# Patient Record
Sex: FEMALE | Race: WHITE | NOT HISPANIC OR LATINO | Employment: UNEMPLOYED | URBAN - METROPOLITAN AREA
[De-identification: names, ages, dates, MRNs, and addresses within clinical notes are randomized per-mention and may not be internally consistent; named-entity substitution may affect disease eponyms.]

---

## 2019-02-07 ENCOUNTER — OFFICE VISIT (OUTPATIENT)
Dept: OBGYN | Age: 39
End: 2019-02-07

## 2019-02-07 DIAGNOSIS — Z01.419 WELL WOMAN EXAM WITH ROUTINE GYNECOLOGICAL EXAM: ICD-10-CM

## 2019-02-07 DIAGNOSIS — Z30.011 ENCOUNTER FOR INITIAL PRESCRIPTION OF CONTRACEPTIVE PILLS: Primary | ICD-10-CM

## 2019-02-07 PROCEDURE — 99385 PREV VISIT NEW AGE 18-39: CPT | Performed by: OBSTETRICS & GYNECOLOGY

## 2019-02-07 RX ORDER — LORAZEPAM 1 MG/1
1 TABLET ORAL EVERY 6 HOURS PRN
COMMUNITY
End: 2021-03-30 | Stop reason: SDUPTHER

## 2019-02-07 RX ORDER — ACETAMINOPHEN AND CODEINE PHOSPHATE 120; 12 MG/5ML; MG/5ML
1 SOLUTION ORAL DAILY
Qty: 84 TABLET | Refills: 3 | Status: SHIPPED | OUTPATIENT
Start: 2019-02-07 | End: 2020-01-10 | Stop reason: SDUPTHER

## 2019-02-07 RX ORDER — NORGESTIMATE AND ETHINYL ESTRADIOL 7DAYSX3 28
1 KIT ORAL DAILY
COMMUNITY
End: 2021-03-30 | Stop reason: ALTCHOICE

## 2019-02-07 ASSESSMENT — PAIN SCALES - GENERAL: PAINLEVEL: 0

## 2019-02-12 LAB — HPV16+18+45 E6+E7MRNA CVX NAA+PROBE: NORMAL

## 2019-04-25 ENCOUNTER — TELEPHONE (OUTPATIENT)
Dept: OBGYN | Age: 39
End: 2019-04-25

## 2020-01-10 RX ORDER — ACETAMINOPHEN AND CODEINE PHOSPHATE 120; 12 MG/5ML; MG/5ML
1 SOLUTION ORAL DAILY
Qty: 84 TABLET | Refills: 0 | Status: SHIPPED | OUTPATIENT
Start: 2020-01-10 | End: 2020-04-06

## 2020-04-06 RX ORDER — ACETAMINOPHEN AND CODEINE PHOSPHATE 120; 12 MG/5ML; MG/5ML
1 SOLUTION ORAL DAILY
Qty: 84 TABLET | Refills: 0 | Status: SHIPPED | OUTPATIENT
Start: 2020-04-06 | End: 2020-06-29

## 2020-06-29 RX ORDER — ACETAMINOPHEN AND CODEINE PHOSPHATE 120; 12 MG/5ML; MG/5ML
1 SOLUTION ORAL DAILY
Qty: 28 TABLET | Refills: 0 | Status: SHIPPED | OUTPATIENT
Start: 2020-06-29 | End: 2020-07-27 | Stop reason: SDUPTHER

## 2020-07-22 RX ORDER — ACETAMINOPHEN AND CODEINE PHOSPHATE 120; 12 MG/5ML; MG/5ML
1 SOLUTION ORAL DAILY
Qty: 28 TABLET | Refills: 0 | OUTPATIENT
Start: 2020-07-22

## 2020-07-27 ENCOUNTER — TELEPHONE (OUTPATIENT)
Dept: OBGYN | Age: 40
End: 2020-07-27

## 2020-07-27 RX ORDER — ACETAMINOPHEN AND CODEINE PHOSPHATE 120; 12 MG/5ML; MG/5ML
1 SOLUTION ORAL DAILY
Qty: 28 TABLET | Refills: 0 | Status: SHIPPED | OUTPATIENT
Start: 2020-07-27 | End: 2021-03-30 | Stop reason: ALTCHOICE

## 2020-08-24 ENCOUNTER — OFFICE VISIT (OUTPATIENT)
Dept: OBGYN | Age: 40
End: 2020-08-24

## 2020-08-24 DIAGNOSIS — Z12.39 SCREENING FOR BREAST CANCER: Primary | ICD-10-CM

## 2020-08-24 DIAGNOSIS — Z01.419 GYNECOLOGIC EXAM NORMAL: ICD-10-CM

## 2020-08-24 PROCEDURE — 99396 PREV VISIT EST AGE 40-64: CPT | Performed by: OBSTETRICS & GYNECOLOGY

## 2020-08-24 RX ORDER — ACETAMINOPHEN AND CODEINE PHOSPHATE 120; 12 MG/5ML; MG/5ML
1 SOLUTION ORAL DAILY
Qty: 84 TABLET | Refills: 3 | Status: SHIPPED | OUTPATIENT
Start: 2020-08-24 | End: 2021-07-19

## 2020-08-24 RX ORDER — ACETAMINOPHEN AND CODEINE PHOSPHATE 120; 12 MG/5ML; MG/5ML
1 SOLUTION ORAL DAILY
Qty: 28 TABLET | Refills: 0 | OUTPATIENT
Start: 2020-08-24

## 2020-08-24 RX ORDER — ERGOCALCIFEROL 1.25 MG/1
CAPSULE ORAL
COMMUNITY
Start: 2020-06-28 | End: 2021-05-11 | Stop reason: ALTCHOICE

## 2020-08-24 SDOH — SOCIAL STABILITY: SOCIAL INSECURITY
WITHIN THE LAST YEAR, HAVE TO BEEN RAPED OR FORCED TO HAVE ANY KIND OF SEXUAL ACTIVITY BY YOUR PARTNER OR EX-PARTNER?: PATIENT DECLINED

## 2020-08-24 SDOH — ECONOMIC STABILITY: INCOME INSECURITY: HOW HARD IS IT FOR YOU TO PAY FOR THE VERY BASICS LIKE FOOD, HOUSING, MEDICAL CARE, AND HEATING?: PATIENT DECLINED

## 2020-08-24 SDOH — ECONOMIC STABILITY: FOOD INSECURITY: WITHIN THE PAST 12 MONTHS, THE FOOD YOU BOUGHT JUST DIDN'T LAST AND YOU DIDN'T HAVE MONEY TO GET MORE.: PATIENT DECLINED

## 2020-08-24 SDOH — SOCIAL STABILITY: SOCIAL INSECURITY
WITHIN THE LAST YEAR, HAVE YOU BEEN KICKED, HIT, SLAPPED, OR OTHERWISE PHYSICALLY HURT BY YOUR PARTNER OR EX-PARTNER?: PATIENT DECLINED

## 2020-08-24 SDOH — ECONOMIC STABILITY: TRANSPORTATION INSECURITY
IN THE PAST 12 MONTHS, HAS LACK OF TRANSPORTATION KEPT YOU FROM MEETINGS, WORK, OR FROM GETTING THINGS NEEDED FOR DAILY LIVING?: PATIENT DECLINED

## 2020-08-24 SDOH — SOCIAL STABILITY: SOCIAL NETWORK: HOW OFTEN DO YOU ATTEND CHURCH OR RELIGIOUS SERVICES?: PATIENT DECLINED

## 2020-08-24 SDOH — SOCIAL STABILITY: SOCIAL INSECURITY: WITHIN THE LAST YEAR, HAVE YOU BEEN AFRAID OF YOUR PARTNER OR EX-PARTNER?: PATIENT DECLINED

## 2020-08-24 SDOH — SOCIAL STABILITY: SOCIAL NETWORK: HOW OFTEN DO YOU ATTENT MEETINGS OF THE CLUB OR ORGANIZATION YOU BELONG TO?: PATIENT DECLINED

## 2020-08-24 SDOH — SOCIAL STABILITY: SOCIAL INSECURITY
WITHIN THE LAST YEAR, HAVE YOU BEEN HUMILIATED OR EMOTIONALLY ABUSED IN OTHER WAYS BY YOUR PARTNER OR EX-PARTNER?: PATIENT DECLINED

## 2020-08-24 SDOH — ECONOMIC STABILITY: FOOD INSECURITY: WITHIN THE PAST 12 MONTHS, YOU WORRIED THAT YOUR FOOD WOULD RUN OUT BEFORE YOU GOT MONEY TO BUY MORE.: PATIENT DECLINED

## 2020-08-24 SDOH — SOCIAL STABILITY: SOCIAL NETWORK: ARE YOU MARRIED, WIDOWED, DIVORCED, SEPARATED, NEVER MARRIED, OR LIVING WITH A PARTNER?: PATIENT DECLINED

## 2020-08-24 SDOH — ECONOMIC STABILITY: TRANSPORTATION INSECURITY
IN THE PAST 12 MONTHS, HAS THE LACK OF TRANSPORTATION KEPT YOU FROM MEDICAL APPOINTMENTS OR FROM GETTING MEDICATIONS?: PATIENT DECLINED

## 2020-08-24 SDOH — HEALTH STABILITY: MENTAL HEALTH
STRESS IS WHEN SOMEONE FEELS TENSE, NERVOUS, ANXIOUS, OR CAN'T SLEEP AT NIGHT BECAUSE THEIR MIND IS TROUBLED. HOW STRESSED ARE YOU?: PATIENT DECLINED

## 2020-08-24 SDOH — HEALTH STABILITY: PHYSICAL HEALTH: ON AVERAGE, HOW MANY DAYS PER WEEK DO YOU ENGAGE IN MODERATE TO STRENUOUS EXERCISE (LIKE A BRISK WALK)?: 0 DAYS

## 2020-08-24 SDOH — SOCIAL STABILITY: SOCIAL NETWORK: HOW OFTEN DO YOU GET TOGETHER WITH FRIENDS OR RELATIVES?: PATIENT DECLINED

## 2020-08-24 SDOH — HEALTH STABILITY: PHYSICAL HEALTH: ON AVERAGE, HOW MANY MINUTES DO YOU ENGAGE IN EXERCISE AT THIS LEVEL?: PATIENT DECLINED

## 2020-08-24 SDOH — SOCIAL STABILITY: SOCIAL NETWORK
DO YOU BELONG TO ANY CLUBS OR ORGANIZATIONS SUCH AS CHURCH GROUPS UNIONS, FRATERNAL OR ATHLETIC GROUPS, OR SCHOOL GROUPS?: PATIENT DECLINED

## 2020-08-24 SDOH — SOCIAL STABILITY: SOCIAL NETWORK: IN A TYPICAL WEEK, HOW MANY TIMES DO YOU TALK ON THE PHONE WITH FAMILY, FRIENDS, OR NEIGHBORS?: PATIENT DECLINED

## 2020-08-24 ASSESSMENT — PATIENT HEALTH QUESTIONNAIRE - PHQ9
2. FEELING DOWN, DEPRESSED OR HOPELESS: NOT AT ALL
1. LITTLE INTEREST OR PLEASURE IN DOING THINGS: NOT AT ALL
SUM OF ALL RESPONSES TO PHQ9 QUESTIONS 1 AND 2: 0
CLINICAL INTERPRETATION OF PHQ2 SCORE: NO FURTHER SCREENING NEEDED
CLINICAL INTERPRETATION OF PHQ9 SCORE: NO FURTHER SCREENING NEEDED
SUM OF ALL RESPONSES TO PHQ9 QUESTIONS 1 AND 2: 0

## 2020-08-24 ASSESSMENT — PAIN SCALES - GENERAL: PAINLEVEL: 0

## 2020-08-25 DIAGNOSIS — R14.0 BLOATING: Primary | ICD-10-CM

## 2020-08-26 ENCOUNTER — TELEPHONE (OUTPATIENT)
Dept: OBGYN | Age: 40
End: 2020-08-26

## 2020-08-28 LAB — HPV16+18+45 E6+E7MRNA CVX NAA+PROBE: NORMAL

## 2021-03-30 ENCOUNTER — OFFICE VISIT (OUTPATIENT)
Dept: INTERNAL MEDICINE | Age: 41
End: 2021-03-30

## 2021-03-30 DIAGNOSIS — F41.1 GENERALIZED ANXIETY DISORDER: Primary | ICD-10-CM

## 2021-03-30 DIAGNOSIS — F17.219 CIGARETTE NICOTINE DEPENDENCE WITH NICOTINE-INDUCED DISORDER: ICD-10-CM

## 2021-03-30 DIAGNOSIS — E66.01 SEVERE OBESITY (BMI >= 40) (CMD): ICD-10-CM

## 2021-03-30 PROBLEM — F17.229 CHEWING TOBACCO NICOTINE DEPENDENCE WITH NICOTINE-INDUCED DISORDER: Status: ACTIVE | Noted: 2021-03-30

## 2021-03-30 PROBLEM — Z87.81 HISTORY OF FRACTURE OF RIGHT ANKLE: Status: ACTIVE | Noted: 2021-03-30

## 2021-03-30 PROCEDURE — 99204 OFFICE O/P NEW MOD 45 MIN: CPT | Performed by: INTERNAL MEDICINE

## 2021-03-30 RX ORDER — LORAZEPAM 1 MG/1
1 TABLET ORAL EVERY 12 HOURS PRN
Qty: 10 TABLET | Refills: 0 | Status: SHIPPED | OUTPATIENT
Start: 2021-03-30 | End: 2021-05-07

## 2021-03-30 ASSESSMENT — ENCOUNTER SYMPTOMS
ACTIVITY CHANGE: 0
COUGH: 0
FATIGUE: 0
SINUS PRESSURE: 0
BACK PAIN: 0
NERVOUS/ANXIOUS: 0
NAUSEA: 0
POLYPHAGIA: 0
WOUND: 0
EYE PAIN: 0
DIARRHEA: 0
HEADACHES: 0
APPETITE CHANGE: 0
CHILLS: 0
POLYDIPSIA: 0
EYE ITCHING: 0
VOMITING: 0
EYE DISCHARGE: 0
EYE REDNESS: 0
CONSTIPATION: 0
CHEST TIGHTNESS: 0
SHORTNESS OF BREATH: 0
FEVER: 0
RHINORRHEA: 0
AGITATION: 0

## 2021-03-30 ASSESSMENT — PATIENT HEALTH QUESTIONNAIRE - PHQ9
SUM OF ALL RESPONSES TO PHQ9 QUESTIONS 1 AND 2: 0
2. FEELING DOWN, DEPRESSED OR HOPELESS: NOT AT ALL
CLINICAL INTERPRETATION OF PHQ9 SCORE: NO FURTHER SCREENING NEEDED
1. LITTLE INTEREST OR PLEASURE IN DOING THINGS: NOT AT ALL
CLINICAL INTERPRETATION OF PHQ2 SCORE: NO FURTHER SCREENING NEEDED
SUM OF ALL RESPONSES TO PHQ9 QUESTIONS 1 AND 2: 0

## 2021-03-30 ASSESSMENT — PAIN SCALES - GENERAL: PAINLEVEL: 0

## 2021-05-06 DIAGNOSIS — F41.1 GENERALIZED ANXIETY DISORDER: ICD-10-CM

## 2021-05-07 RX ORDER — LORAZEPAM 1 MG/1
1 TABLET ORAL EVERY 12 HOURS PRN
Qty: 10 TABLET | Refills: 0 | Status: SHIPPED | OUTPATIENT
Start: 2021-05-07 | End: 2021-06-24

## 2021-05-11 ENCOUNTER — OFFICE VISIT (OUTPATIENT)
Dept: INTERNAL MEDICINE | Age: 41
End: 2021-05-11

## 2021-05-11 ENCOUNTER — LAB SERVICES (OUTPATIENT)
Dept: LAB | Age: 41
End: 2021-05-11

## 2021-05-11 DIAGNOSIS — E55.9 VITAMIN D DEFICIENCY: Primary | ICD-10-CM

## 2021-05-11 DIAGNOSIS — E66.01 SEVERE OBESITY (BMI >= 40) (CMD): ICD-10-CM

## 2021-05-11 DIAGNOSIS — F41.1 GENERALIZED ANXIETY DISORDER: ICD-10-CM

## 2021-05-11 DIAGNOSIS — E55.9 VITAMIN D DEFICIENCY: ICD-10-CM

## 2021-05-11 DIAGNOSIS — F17.219 CIGARETTE NICOTINE DEPENDENCE WITH NICOTINE-INDUCED DISORDER: ICD-10-CM

## 2021-05-11 PROCEDURE — 99214 OFFICE O/P EST MOD 30 MIN: CPT | Performed by: INTERNAL MEDICINE

## 2021-05-11 PROCEDURE — 80061 LIPID PANEL: CPT | Performed by: INTERNAL MEDICINE

## 2021-05-11 PROCEDURE — 36415 COLL VENOUS BLD VENIPUNCTURE: CPT | Performed by: INTERNAL MEDICINE

## 2021-05-11 PROCEDURE — 80053 COMPREHEN METABOLIC PANEL: CPT | Performed by: INTERNAL MEDICINE

## 2021-05-11 PROCEDURE — 82306 VITAMIN D 25 HYDROXY: CPT | Performed by: INTERNAL MEDICINE

## 2021-05-11 ASSESSMENT — PATIENT HEALTH QUESTIONNAIRE - PHQ9
SUM OF ALL RESPONSES TO PHQ9 QUESTIONS 1 AND 2: 0
CLINICAL INTERPRETATION OF PHQ9 SCORE: NO FURTHER SCREENING NEEDED
1. LITTLE INTEREST OR PLEASURE IN DOING THINGS: NOT AT ALL
CLINICAL INTERPRETATION OF PHQ2 SCORE: NO FURTHER SCREENING NEEDED
SUM OF ALL RESPONSES TO PHQ9 QUESTIONS 1 AND 2: 0
2. FEELING DOWN, DEPRESSED OR HOPELESS: NOT AT ALL

## 2021-05-11 ASSESSMENT — ENCOUNTER SYMPTOMS
BACK PAIN: 0
EYE DISCHARGE: 0
SINUS PRESSURE: 0
WOUND: 0
CHILLS: 0
EYE REDNESS: 0
VOMITING: 0
CONSTIPATION: 0
RHINORRHEA: 0
NAUSEA: 0
POLYDIPSIA: 0
NERVOUS/ANXIOUS: 0
EYE PAIN: 0
DIARRHEA: 0
ACTIVITY CHANGE: 0
SHORTNESS OF BREATH: 0
FATIGUE: 0
FEVER: 0
EYE ITCHING: 0
AGITATION: 0
POLYPHAGIA: 0
COUGH: 0
CHEST TIGHTNESS: 0
HEADACHES: 0
APPETITE CHANGE: 0

## 2021-05-11 ASSESSMENT — PAIN SCALES - GENERAL: PAINLEVEL: 0

## 2021-05-12 LAB
25(OH)D3+25(OH)D2 SERPL-MCNC: 30.7 NG/ML (ref 30–100)
ALBUMIN SERPL-MCNC: 3.7 G/DL (ref 3.6–5.1)
ALBUMIN/GLOB SERPL: 1 {RATIO} (ref 1–2.4)
ALP SERPL-CCNC: 103 UNITS/L (ref 45–117)
ALT SERPL-CCNC: 30 UNITS/L
ANION GAP SERPL CALC-SCNC: 13 MMOL/L (ref 10–20)
AST SERPL-CCNC: 26 UNITS/L
BILIRUB SERPL-MCNC: 0.3 MG/DL (ref 0.2–1)
BUN SERPL-MCNC: 13 MG/DL (ref 6–20)
BUN/CREAT SERPL: 16 (ref 7–25)
CALCIUM SERPL-MCNC: 9 MG/DL (ref 8.4–10.2)
CHLORIDE SERPL-SCNC: 105 MMOL/L (ref 98–107)
CHOLEST SERPL-MCNC: 176 MG/DL
CHOLEST/HDLC SERPL: 3.5 {RATIO}
CO2 SERPL-SCNC: 23 MMOL/L (ref 21–32)
CREAT SERPL-MCNC: 0.81 MG/DL (ref 0.51–0.95)
FASTING DURATION TIME PATIENT: ABNORMAL H
FASTING DURATION TIME PATIENT: NORMAL H
GFR SERPLBLD BASED ON 1.73 SQ M-ARVRAT: >90 ML/MIN/1.73M2
GLOBULIN SER-MCNC: 3.6 G/DL (ref 2–4)
GLUCOSE SERPL-MCNC: 82 MG/DL (ref 65–99)
HDLC SERPL-MCNC: 51 MG/DL
LDLC SERPL CALC-MCNC: 81 MG/DL
NONHDLC SERPL-MCNC: 125 MG/DL
POTASSIUM SERPL-SCNC: 4 MMOL/L (ref 3.4–5.1)
PROT SERPL-MCNC: 7.3 G/DL (ref 6.4–8.2)
SODIUM SERPL-SCNC: 137 MMOL/L (ref 135–145)
TRIGL SERPL-MCNC: 220 MG/DL

## 2021-05-26 VITALS
RESPIRATION RATE: 18 BRPM | BODY MASS INDEX: 46.5 KG/M2 | TEMPERATURE: 98.3 F | BODY MASS INDEX: 45.47 KG/M2 | SYSTOLIC BLOOD PRESSURE: 122 MMHG | WEIGHT: 261.69 LBS | WEIGHT: 272.38 LBS | WEIGHT: 266.32 LBS | HEART RATE: 78 BPM | DIASTOLIC BLOOD PRESSURE: 82 MMHG | HEART RATE: 86 BPM | WEIGHT: 256.39 LBS | HEIGHT: 64 IN | OXYGEN SATURATION: 99 % | HEIGHT: 64 IN | HEIGHT: 64 IN | TEMPERATURE: 98.2 F | RESPIRATION RATE: 18 BRPM | HEART RATE: 100 BPM | HEIGHT: 64 IN | DIASTOLIC BLOOD PRESSURE: 85 MMHG | TEMPERATURE: 99.2 F | OXYGEN SATURATION: 100 % | BODY MASS INDEX: 43.77 KG/M2 | TEMPERATURE: 98.3 F | RESPIRATION RATE: 18 BRPM | OXYGEN SATURATION: 98 % | SYSTOLIC BLOOD PRESSURE: 128 MMHG | HEART RATE: 92 BPM | SYSTOLIC BLOOD PRESSURE: 131 MMHG | DIASTOLIC BLOOD PRESSURE: 88 MMHG | DIASTOLIC BLOOD PRESSURE: 83 MMHG | OXYGEN SATURATION: 98 % | RESPIRATION RATE: 17 BRPM | BODY MASS INDEX: 44.68 KG/M2 | SYSTOLIC BLOOD PRESSURE: 130 MMHG

## 2021-06-24 DIAGNOSIS — F41.1 GENERALIZED ANXIETY DISORDER: ICD-10-CM

## 2021-06-24 RX ORDER — LORAZEPAM 1 MG/1
1 TABLET ORAL EVERY 12 HOURS PRN
Qty: 10 TABLET | Refills: 0 | Status: SHIPPED | OUTPATIENT
Start: 2021-06-24 | End: 2021-09-03

## 2021-07-19 RX ORDER — ACETAMINOPHEN AND CODEINE PHOSPHATE 120; 12 MG/5ML; MG/5ML
1 SOLUTION ORAL DAILY
Qty: 84 TABLET | Refills: 0 | Status: SHIPPED | OUTPATIENT
Start: 2021-07-19 | End: 2022-04-28 | Stop reason: SDUPTHER

## 2021-08-11 ENCOUNTER — APPOINTMENT (OUTPATIENT)
Dept: INTERNAL MEDICINE | Age: 41
End: 2021-08-11

## 2021-09-03 DIAGNOSIS — F41.1 GENERALIZED ANXIETY DISORDER: ICD-10-CM

## 2021-09-03 RX ORDER — LORAZEPAM 1 MG/1
1 TABLET ORAL EVERY 12 HOURS PRN
Qty: 10 TABLET | Refills: 0 | Status: SHIPPED | OUTPATIENT
Start: 2021-09-03 | End: 2021-10-12

## 2021-10-11 DIAGNOSIS — F41.1 GENERALIZED ANXIETY DISORDER: ICD-10-CM

## 2021-10-12 RX ORDER — ACETAMINOPHEN AND CODEINE PHOSPHATE 120; 12 MG/5ML; MG/5ML
1 SOLUTION ORAL DAILY
Qty: 84 TABLET | Refills: 0 | OUTPATIENT
Start: 2021-10-12

## 2021-10-12 RX ORDER — LORAZEPAM 1 MG/1
1 TABLET ORAL EVERY 12 HOURS PRN
Qty: 10 TABLET | Refills: 0 | Status: SHIPPED | OUTPATIENT
Start: 2021-10-12 | End: 2021-11-14

## 2021-10-14 ENCOUNTER — OFFICE VISIT (OUTPATIENT)
Dept: OBGYN | Age: 41
End: 2021-10-14

## 2021-10-14 VITALS
HEIGHT: 64 IN | OXYGEN SATURATION: 100 % | DIASTOLIC BLOOD PRESSURE: 82 MMHG | HEART RATE: 88 BPM | SYSTOLIC BLOOD PRESSURE: 116 MMHG | RESPIRATION RATE: 16 BRPM | WEIGHT: 254.63 LBS | BODY MASS INDEX: 43.47 KG/M2 | TEMPERATURE: 97.7 F

## 2021-10-14 DIAGNOSIS — Z12.31 ENCOUNTER FOR SCREENING MAMMOGRAM FOR MALIGNANT NEOPLASM OF BREAST: ICD-10-CM

## 2021-10-14 DIAGNOSIS — Z32.02 NEGATIVE PREGNANCY TEST: Primary | ICD-10-CM

## 2021-10-14 DIAGNOSIS — N92.6 IRREGULAR BLEEDING: ICD-10-CM

## 2021-10-14 LAB
B-HCG UR QL: NEGATIVE
INTERNAL PROCEDURAL CONTROLS ACCEPTABLE: NO

## 2021-10-14 PROCEDURE — 81025 URINE PREGNANCY TEST: CPT | Performed by: OBSTETRICS & GYNECOLOGY

## 2021-10-14 PROCEDURE — G0439 PPPS, SUBSEQ VISIT: HCPCS | Performed by: OBSTETRICS & GYNECOLOGY

## 2021-10-14 RX ORDER — ACETAMINOPHEN AND CODEINE PHOSPHATE 120; 12 MG/5ML; MG/5ML
1 SOLUTION ORAL DAILY
Qty: 84 TABLET | Refills: 3 | Status: SHIPPED | OUTPATIENT
Start: 2021-10-14 | End: 2022-06-03

## 2021-10-14 ASSESSMENT — PATIENT HEALTH QUESTIONNAIRE - PHQ9
2. FEELING DOWN, DEPRESSED OR HOPELESS: NOT AT ALL
1. LITTLE INTEREST OR PLEASURE IN DOING THINGS: NOT AT ALL
SUM OF ALL RESPONSES TO PHQ9 QUESTIONS 1 AND 2: 0
CLINICAL INTERPRETATION OF PHQ2 SCORE: NO FURTHER SCREENING NEEDED
SUM OF ALL RESPONSES TO PHQ9 QUESTIONS 1 AND 2: 0
CLINICAL INTERPRETATION OF PHQ9 SCORE: NO FURTHER SCREENING NEEDED

## 2021-10-14 ASSESSMENT — PAIN SCALES - GENERAL: PAINLEVEL: 0

## 2021-11-14 DIAGNOSIS — F41.1 GENERALIZED ANXIETY DISORDER: ICD-10-CM

## 2021-11-14 RX ORDER — LORAZEPAM 1 MG/1
1 TABLET ORAL EVERY 12 HOURS PRN
Qty: 10 TABLET | Refills: 0 | Status: SHIPPED | OUTPATIENT
Start: 2021-11-14 | End: 2022-01-14

## 2022-01-14 DIAGNOSIS — F41.1 GENERALIZED ANXIETY DISORDER: ICD-10-CM

## 2022-01-14 RX ORDER — LORAZEPAM 1 MG/1
1 TABLET ORAL EVERY 12 HOURS PRN
Qty: 10 TABLET | Refills: 0 | Status: SHIPPED | OUTPATIENT
Start: 2022-01-14 | End: 2022-04-28 | Stop reason: SDUPTHER

## 2022-02-06 ENCOUNTER — E-ADVICE (OUTPATIENT)
Dept: INTERNAL MEDICINE | Age: 42
End: 2022-02-06

## 2022-02-06 DIAGNOSIS — R90.89 ABNORMAL CT OF BRAIN: Primary | ICD-10-CM

## 2022-02-07 PROBLEM — R90.89 ABNORMAL CT OF BRAIN: Status: ACTIVE | Noted: 2022-02-07

## 2022-02-16 ENCOUNTER — TELEPHONE (OUTPATIENT)
Dept: NEUROLOGY | Age: 42
End: 2022-02-16

## 2022-03-02 ENCOUNTER — OFFICE VISIT (OUTPATIENT)
Dept: NEUROLOGY | Age: 42
End: 2022-03-02
Attending: INTERNAL MEDICINE

## 2022-03-02 VITALS
WEIGHT: 250 LBS | HEART RATE: 98 BPM | HEIGHT: 64 IN | BODY MASS INDEX: 42.68 KG/M2 | DIASTOLIC BLOOD PRESSURE: 87 MMHG | SYSTOLIC BLOOD PRESSURE: 133 MMHG

## 2022-03-02 DIAGNOSIS — R93.0 ABNORMAL CT OF THE HEAD: Primary | ICD-10-CM

## 2022-03-02 DIAGNOSIS — F17.219 CIGARETTE NICOTINE DEPENDENCE WITH NICOTINE-INDUCED DISORDER: ICD-10-CM

## 2022-03-02 PROCEDURE — 99205 OFFICE O/P NEW HI 60 MIN: CPT | Performed by: PSYCHIATRY & NEUROLOGY

## 2022-03-14 ENCOUNTER — HOSPITAL ENCOUNTER (OUTPATIENT)
Dept: MRI IMAGING | Age: 42
Discharge: HOME | End: 2022-03-14
Attending: PSYCHIATRY & NEUROLOGY

## 2022-03-14 DIAGNOSIS — R93.0 ABNORMAL CT OF THE HEAD: ICD-10-CM

## 2022-03-14 PROCEDURE — 10002805 HB CONTRAST AGENT: Performed by: PSYCHIATRY & NEUROLOGY

## 2022-03-14 PROCEDURE — A9585 GADOBUTROL INJECTION: HCPCS | Performed by: PSYCHIATRY & NEUROLOGY

## 2022-03-14 PROCEDURE — 70553 MRI BRAIN STEM W/O & W/DYE: CPT

## 2022-03-14 RX ORDER — GADOBUTROL 604.72 MG/ML
9 INJECTION INTRAVENOUS ONCE
Status: COMPLETED | OUTPATIENT
Start: 2022-03-14 | End: 2022-03-14

## 2022-03-14 RX ADMIN — GADOBUTROL 9 ML: 604.72 INJECTION INTRAVENOUS at 14:25

## 2022-03-16 ENCOUNTER — OFFICE VISIT (OUTPATIENT)
Dept: NEUROLOGY | Age: 42
End: 2022-03-16

## 2022-03-16 VITALS
HEIGHT: 64 IN | BODY MASS INDEX: 42.68 KG/M2 | SYSTOLIC BLOOD PRESSURE: 135 MMHG | HEART RATE: 85 BPM | DIASTOLIC BLOOD PRESSURE: 85 MMHG | WEIGHT: 250 LBS

## 2022-03-16 DIAGNOSIS — R93.0 ABNORMAL CT OF THE HEAD: Primary | ICD-10-CM

## 2022-03-16 PROCEDURE — 99215 OFFICE O/P EST HI 40 MIN: CPT | Performed by: PSYCHIATRY & NEUROLOGY

## 2022-04-28 ENCOUNTER — OFFICE VISIT (OUTPATIENT)
Dept: INTERNAL MEDICINE | Age: 42
End: 2022-04-28

## 2022-04-28 VITALS
HEART RATE: 93 BPM | DIASTOLIC BLOOD PRESSURE: 81 MMHG | SYSTOLIC BLOOD PRESSURE: 125 MMHG | WEIGHT: 260.25 LBS | HEIGHT: 64 IN | RESPIRATION RATE: 18 BRPM | OXYGEN SATURATION: 99 % | BODY MASS INDEX: 44.43 KG/M2 | TEMPERATURE: 98.5 F

## 2022-04-28 DIAGNOSIS — F41.1 GENERALIZED ANXIETY DISORDER: ICD-10-CM

## 2022-04-28 PROCEDURE — 99213 OFFICE O/P EST LOW 20 MIN: CPT | Performed by: INTERNAL MEDICINE

## 2022-04-28 RX ORDER — LORAZEPAM 1 MG/1
1 TABLET ORAL EVERY 12 HOURS PRN
Qty: 25 TABLET | Refills: 0 | Status: SHIPPED | OUTPATIENT
Start: 2022-04-28

## 2022-04-28 ASSESSMENT — ENCOUNTER SYMPTOMS
WOUND: 0
EYE REDNESS: 0
EYE PAIN: 0
CHEST TIGHTNESS: 0
BACK PAIN: 0
EYE DISCHARGE: 0
CHILLS: 0
POLYDIPSIA: 0
DIARRHEA: 0
FEVER: 0
ACTIVITY CHANGE: 0
COUGH: 0
SINUS PRESSURE: 0
APPETITE CHANGE: 0
NERVOUS/ANXIOUS: 1
EYE ITCHING: 0
HEADACHES: 0
NAUSEA: 0
CONSTIPATION: 0
VOMITING: 0
RHINORRHEA: 0
FATIGUE: 0
SHORTNESS OF BREATH: 0
POLYPHAGIA: 0

## 2022-04-28 ASSESSMENT — PATIENT HEALTH QUESTIONNAIRE - PHQ9
1. LITTLE INTEREST OR PLEASURE IN DOING THINGS: NOT AT ALL
2. FEELING DOWN, DEPRESSED OR HOPELESS: NOT AT ALL
SUM OF ALL RESPONSES TO PHQ9 QUESTIONS 1 AND 2: 0
CLINICAL INTERPRETATION OF PHQ2 SCORE: NO FURTHER SCREENING NEEDED
SUM OF ALL RESPONSES TO PHQ9 QUESTIONS 1 AND 2: 0

## 2022-04-28 ASSESSMENT — PAIN SCALES - GENERAL: PAINLEVEL: 0

## 2022-04-30 DIAGNOSIS — F41.1 GENERALIZED ANXIETY DISORDER: ICD-10-CM

## 2022-05-01 RX ORDER — LORAZEPAM 1 MG/1
1 TABLET ORAL EVERY 12 HOURS PRN
Qty: 10 TABLET | OUTPATIENT
Start: 2022-05-01

## 2022-05-13 ENCOUNTER — E-ADVICE (OUTPATIENT)
Dept: INTERNAL MEDICINE | Age: 42
End: 2022-05-13

## 2022-05-13 DIAGNOSIS — U07.1 COVID-19 VIRUS INFECTION: Primary | ICD-10-CM

## 2022-06-03 RX ORDER — ACETAMINOPHEN AND CODEINE PHOSPHATE 120; 12 MG/5ML; MG/5ML
1 SOLUTION ORAL DAILY
Qty: 84 TABLET | Refills: 0 | Status: SHIPPED | OUTPATIENT
Start: 2022-06-03 | End: 2022-08-25

## 2022-06-15 ENCOUNTER — E-ADVICE (OUTPATIENT)
Dept: INTERNAL MEDICINE | Age: 42
End: 2022-06-15

## 2022-06-15 DIAGNOSIS — Z86.16 HISTORY OF 2019 NOVEL CORONAVIRUS DISEASE (COVID-19): ICD-10-CM

## 2022-06-15 DIAGNOSIS — R05.9 COUGH: Primary | ICD-10-CM

## 2022-06-17 ENCOUNTER — OFFICE VISIT (OUTPATIENT)
Dept: INTERNAL MEDICINE | Age: 42
End: 2022-06-17

## 2022-06-17 ENCOUNTER — IMAGING SERVICES (OUTPATIENT)
Dept: GENERAL RADIOLOGY | Age: 42
End: 2022-06-17
Attending: INTERNAL MEDICINE

## 2022-06-17 VITALS
RESPIRATION RATE: 18 BRPM | OXYGEN SATURATION: 97 % | HEIGHT: 64 IN | TEMPERATURE: 98.4 F | HEART RATE: 85 BPM | DIASTOLIC BLOOD PRESSURE: 75 MMHG | SYSTOLIC BLOOD PRESSURE: 122 MMHG | BODY MASS INDEX: 45.03 KG/M2 | WEIGHT: 263.78 LBS

## 2022-06-17 DIAGNOSIS — E66.01 SEVERE OBESITY (BMI >= 40) (CMD): ICD-10-CM

## 2022-06-17 DIAGNOSIS — Z86.16 HISTORY OF 2019 NOVEL CORONAVIRUS DISEASE (COVID-19): ICD-10-CM

## 2022-06-17 DIAGNOSIS — Z72.0 SMOKING TRYING TO QUIT: ICD-10-CM

## 2022-06-17 DIAGNOSIS — R05.3 CHRONIC COUGH: Primary | ICD-10-CM

## 2022-06-17 DIAGNOSIS — R05.3 CHRONIC COUGH: ICD-10-CM

## 2022-06-17 PROCEDURE — 99214 OFFICE O/P EST MOD 30 MIN: CPT | Performed by: INTERNAL MEDICINE

## 2022-06-17 PROCEDURE — 71046 X-RAY EXAM CHEST 2 VIEWS: CPT | Performed by: INTERNAL MEDICINE

## 2022-06-17 RX ORDER — BUPROPION HYDROCHLORIDE 150 MG/1
TABLET ORAL
Qty: 30 TABLET | Refills: 3 | Status: SHIPPED | OUTPATIENT
Start: 2022-06-17

## 2022-06-17 ASSESSMENT — PATIENT HEALTH QUESTIONNAIRE - PHQ9
2. FEELING DOWN, DEPRESSED OR HOPELESS: NOT AT ALL
1. LITTLE INTEREST OR PLEASURE IN DOING THINGS: NOT AT ALL
SUM OF ALL RESPONSES TO PHQ9 QUESTIONS 1 AND 2: 0
CLINICAL INTERPRETATION OF PHQ2 SCORE: NO FURTHER SCREENING NEEDED
SUM OF ALL RESPONSES TO PHQ9 QUESTIONS 1 AND 2: 0

## 2022-06-17 ASSESSMENT — PAIN SCALES - GENERAL: PAINLEVEL: 0

## 2022-07-11 ENCOUNTER — APPOINTMENT (OUTPATIENT)
Dept: NEUROLOGY | Age: 42
End: 2022-07-11

## 2022-08-18 ENCOUNTER — APPOINTMENT (OUTPATIENT)
Dept: INTERNAL MEDICINE | Age: 42
End: 2022-08-18

## 2022-08-25 ENCOUNTER — APPOINTMENT (OUTPATIENT)
Dept: INTERNAL MEDICINE | Age: 42
End: 2022-08-25

## 2022-08-25 RX ORDER — ACETAMINOPHEN AND CODEINE PHOSPHATE 120; 12 MG/5ML; MG/5ML
1 SOLUTION ORAL DAILY
Qty: 84 TABLET | Refills: 0 | Status: SHIPPED | OUTPATIENT
Start: 2022-08-25 | End: 2022-10-28

## 2022-10-12 ENCOUNTER — OFFICE VISIT (OUTPATIENT)
Dept: INTERNAL MEDICINE CLINIC | Facility: CLINIC | Age: 42
End: 2022-10-12
Payer: COMMERCIAL

## 2022-10-12 VITALS
DIASTOLIC BLOOD PRESSURE: 84 MMHG | BODY MASS INDEX: 44.9 KG/M2 | OXYGEN SATURATION: 98 % | HEART RATE: 88 BPM | HEIGHT: 64 IN | TEMPERATURE: 98 F | SYSTOLIC BLOOD PRESSURE: 122 MMHG | WEIGHT: 263 LBS

## 2022-10-12 DIAGNOSIS — Z00.00 ANNUAL PHYSICAL EXAM: Primary | ICD-10-CM

## 2022-10-12 DIAGNOSIS — R93.0 ABNORMAL MRI OF HEAD: ICD-10-CM

## 2022-10-12 DIAGNOSIS — F41.1 GENERALIZED ANXIETY DISORDER: ICD-10-CM

## 2022-10-12 DIAGNOSIS — Z13.1 SCREENING FOR DIABETES MELLITUS: ICD-10-CM

## 2022-10-12 DIAGNOSIS — Z72.0 TOBACCO USE: ICD-10-CM

## 2022-10-12 DIAGNOSIS — Z13.29 SCREENING FOR THYROID DISORDER: ICD-10-CM

## 2022-10-12 DIAGNOSIS — E78.1 HYPERTRIGLYCERIDEMIA: ICD-10-CM

## 2022-10-12 DIAGNOSIS — Z13.0 SCREENING FOR DEFICIENCY ANEMIA: ICD-10-CM

## 2022-10-12 DIAGNOSIS — Z01.419 WELL WOMAN EXAM: ICD-10-CM

## 2022-10-12 DIAGNOSIS — R92.2 DENSE BREAST: ICD-10-CM

## 2022-10-12 DIAGNOSIS — Z12.31 ENCOUNTER FOR SCREENING MAMMOGRAM FOR MALIGNANT NEOPLASM OF BREAST: ICD-10-CM

## 2022-10-12 PROCEDURE — 3008F BODY MASS INDEX DOCD: CPT | Performed by: INTERNAL MEDICINE

## 2022-10-12 PROCEDURE — 3079F DIAST BP 80-89 MM HG: CPT | Performed by: INTERNAL MEDICINE

## 2022-10-12 PROCEDURE — 99386 PREV VISIT NEW AGE 40-64: CPT | Performed by: INTERNAL MEDICINE

## 2022-10-12 PROCEDURE — 3074F SYST BP LT 130 MM HG: CPT | Performed by: INTERNAL MEDICINE

## 2022-10-12 RX ORDER — ACETAMINOPHEN AND CODEINE PHOSPHATE 120; 12 MG/5ML; MG/5ML
0.35 SOLUTION ORAL DAILY
COMMUNITY
Start: 2022-08-23

## 2022-10-12 RX ORDER — LORAZEPAM 1 MG/1
1 TABLET ORAL EVERY 12 HOURS PRN
COMMUNITY
Start: 2020-12-30 | End: 2022-10-12

## 2022-10-12 RX ORDER — LORAZEPAM 1 MG/1
1 TABLET ORAL EVERY 12 HOURS PRN
Qty: 60 TABLET | Refills: 3 | Status: SHIPPED | OUTPATIENT
Start: 2022-10-12

## 2022-10-12 NOTE — PROGRESS NOTES
Release of PHI signed by patient and faxed to Dr. Nette Antunez at [phone] for release of medical records. Confirmation received. Copies to scanning and one week hold.

## 2022-10-28 RX ORDER — ACETAMINOPHEN AND CODEINE PHOSPHATE 120; 12 MG/5ML; MG/5ML
1 SOLUTION ORAL DAILY
Qty: 28 TABLET | Refills: 0 | Status: SHIPPED | OUTPATIENT
Start: 2022-10-28

## 2022-10-31 RX ORDER — ACETAMINOPHEN AND CODEINE PHOSPHATE 120; 12 MG/5ML; MG/5ML
1 SOLUTION ORAL DAILY
Qty: 84 TABLET | OUTPATIENT
Start: 2022-10-31

## 2022-11-21 ENCOUNTER — OFFICE VISIT (OUTPATIENT)
Dept: OBGYN CLINIC | Facility: CLINIC | Age: 42
End: 2022-11-21
Payer: COMMERCIAL

## 2022-11-21 VITALS
DIASTOLIC BLOOD PRESSURE: 85 MMHG | HEIGHT: 64 IN | HEART RATE: 80 BPM | BODY MASS INDEX: 45.21 KG/M2 | SYSTOLIC BLOOD PRESSURE: 134 MMHG | WEIGHT: 264.81 LBS

## 2022-11-21 DIAGNOSIS — Z01.419 WELL WOMAN EXAM WITH ROUTINE GYNECOLOGICAL EXAM: Primary | ICD-10-CM

## 2022-11-21 PROCEDURE — 3075F SYST BP GE 130 - 139MM HG: CPT | Performed by: OBSTETRICS & GYNECOLOGY

## 2022-11-21 PROCEDURE — 3079F DIAST BP 80-89 MM HG: CPT | Performed by: OBSTETRICS & GYNECOLOGY

## 2022-11-21 PROCEDURE — 99386 PREV VISIT NEW AGE 40-64: CPT | Performed by: OBSTETRICS & GYNECOLOGY

## 2022-11-21 PROCEDURE — 3008F BODY MASS INDEX DOCD: CPT | Performed by: OBSTETRICS & GYNECOLOGY

## 2022-11-21 RX ORDER — ACETAMINOPHEN AND CODEINE PHOSPHATE 120; 12 MG/5ML; MG/5ML
0.35 SOLUTION ORAL DAILY
Qty: 84 TABLET | Refills: 4 | Status: SHIPPED | OUTPATIENT
Start: 2022-11-21

## 2022-11-22 ENCOUNTER — HOSPITAL ENCOUNTER (OUTPATIENT)
Dept: MAMMOGRAPHY | Facility: HOSPITAL | Age: 42
Discharge: HOME | End: 2022-11-22
Attending: INTERNAL MEDICINE
Payer: COMMERCIAL

## 2022-11-22 DIAGNOSIS — Z12.31 ENCOUNTER FOR SCREENING MAMMOGRAM FOR MALIGNANT NEOPLASM OF BREAST: ICD-10-CM

## 2022-11-22 DIAGNOSIS — R92.2 DENSE BREAST: ICD-10-CM

## 2022-11-22 PROCEDURE — 77067 SCR MAMMO BI INCL CAD: CPT | Performed by: INTERNAL MEDICINE

## 2022-11-22 PROCEDURE — 77063 BREAST TOMOSYNTHESIS BI: CPT | Performed by: INTERNAL MEDICINE

## 2022-11-23 ENCOUNTER — TELEPHONE (OUTPATIENT)
Dept: INTERNAL MEDICINE CLINIC | Facility: CLINIC | Age: 42
End: 2022-11-23

## 2022-11-23 NOTE — TELEPHONE ENCOUNTER
Please notify patient that I reviewed the mammogram from 11/22/2022    Imaging  Normal mammogram    Recommendations  Repeat breast exam and scan in 1 year  Will follow-up on lab tests was completed

## 2023-01-16 ENCOUNTER — PATIENT MESSAGE (OUTPATIENT)
Dept: INTERNAL MEDICINE CLINIC | Facility: CLINIC | Age: 43
End: 2023-01-16

## 2023-01-16 NOTE — TELEPHONE ENCOUNTER
From: Oanh Foster  To: Kala Carter MD  Sent: 1/16/2023 10:04 AM CST  Subject: Ansley Brand,  Would it be possible to discuss a prescription for Corinne Olivia or Ozempic?

## 2023-01-18 ENCOUNTER — PATIENT MESSAGE (OUTPATIENT)
Dept: INTERNAL MEDICINE CLINIC | Facility: CLINIC | Age: 43
End: 2023-01-18

## 2023-01-18 NOTE — TELEPHONE ENCOUNTER
[employer] message sent.   Advised fasting lab work to be completed, needs appointment

## 2023-01-18 NOTE — TELEPHONE ENCOUNTER
From: Danii Hansen  To: Izabella Russell MD  Sent: 1/16/2023 10:04 AM CST  Subject: Ken Dow,  Would it be possible to discuss a prescription for [facility] or Ozempic?

## 2023-01-27 ENCOUNTER — LAB ENCOUNTER (OUTPATIENT)
Dept: LAB | Facility: HOSPITAL | Age: 43
End: 2023-01-27
Attending: INTERNAL MEDICINE
Payer: COMMERCIAL

## 2023-01-27 DIAGNOSIS — Z00.00 ANNUAL PHYSICAL EXAM: ICD-10-CM

## 2023-01-27 DIAGNOSIS — Z13.0 SCREENING FOR DEFICIENCY ANEMIA: ICD-10-CM

## 2023-01-27 DIAGNOSIS — Z13.29 SCREENING FOR THYROID DISORDER: ICD-10-CM

## 2023-01-27 DIAGNOSIS — E78.1 HYPERTRIGLYCERIDEMIA: ICD-10-CM

## 2023-01-27 LAB
ALBUMIN SERPL-MCNC: 3.7 G/DL (ref 3.4–5)
ALBUMIN/GLOB SERPL: 1.1 {RATIO} (ref 1–2)
ALP LIVER SERPL-CCNC: 84 U/L
ALT SERPL-CCNC: 31 U/L
ANION GAP SERPL CALC-SCNC: 7 MMOL/L (ref 0–18)
AST SERPL-CCNC: 16 U/L (ref 15–37)
BASOPHILS # BLD AUTO: 0.06 X10(3) UL (ref 0–0.2)
BASOPHILS NFR BLD AUTO: 0.6 %
BILIRUB SERPL-MCNC: 0.5 MG/DL (ref 0.1–2)
BUN BLD-MCNC: 10 MG/DL (ref 7–18)
BUN/CREAT SERPL: 16.9 (ref 10–20)
CALCIUM BLD-MCNC: 8.9 MG/DL (ref 8.5–10.1)
CHLORIDE SERPL-SCNC: 107 MMOL/L (ref 98–112)
CHOLEST SERPL-MCNC: 134 MG/DL (ref ?–200)
CO2 SERPL-SCNC: 26 MMOL/L (ref 21–32)
CREAT BLD-MCNC: 0.59 MG/DL
DEPRECATED RDW RBC AUTO: 44.7 FL (ref 35.1–46.3)
EOSINOPHIL # BLD AUTO: 0.35 X10(3) UL (ref 0–0.7)
EOSINOPHIL NFR BLD AUTO: 3.4 %
ERYTHROCYTE [DISTWIDTH] IN BLOOD BY AUTOMATED COUNT: 13.2 % (ref 11–15)
FASTING PATIENT LIPID ANSWER: YES
FASTING STATUS PATIENT QL REPORTED: YES
GFR SERPLBLD BASED ON 1.73 SQ M-ARVRAT: 115 ML/MIN/1.73M2 (ref 60–?)
GLOBULIN PLAS-MCNC: 3.5 G/DL (ref 2.8–4.4)
GLUCOSE BLD-MCNC: 102 MG/DL (ref 70–99)
HCT VFR BLD AUTO: 42.3 %
HDLC SERPL-MCNC: 47 MG/DL (ref 40–59)
HGB BLD-MCNC: 14.1 G/DL
IMM GRANULOCYTES # BLD AUTO: 0.04 X10(3) UL (ref 0–1)
IMM GRANULOCYTES NFR BLD: 0.4 %
LDLC SERPL CALC-MCNC: 71 MG/DL (ref ?–100)
LYMPHOCYTES # BLD AUTO: 2.56 X10(3) UL (ref 1–4)
LYMPHOCYTES NFR BLD AUTO: 24.7 %
MCH RBC QN AUTO: 30.6 PG (ref 26–34)
MCHC RBC AUTO-ENTMCNC: 33.3 G/DL (ref 31–37)
MCV RBC AUTO: 91.8 FL
MONOCYTES # BLD AUTO: 0.57 X10(3) UL (ref 0.1–1)
MONOCYTES NFR BLD AUTO: 5.5 %
NEUTROPHILS # BLD AUTO: 6.79 X10 (3) UL (ref 1.5–7.7)
NEUTROPHILS # BLD AUTO: 6.79 X10(3) UL (ref 1.5–7.7)
NEUTROPHILS NFR BLD AUTO: 65.4 %
NONHDLC SERPL-MCNC: 87 MG/DL (ref ?–130)
OSMOLALITY SERPL CALC.SUM OF ELEC: 289 MOSM/KG (ref 275–295)
PLATELET # BLD AUTO: 270 10(3)UL (ref 150–450)
POTASSIUM SERPL-SCNC: 4.2 MMOL/L (ref 3.5–5.1)
PROT SERPL-MCNC: 7.2 G/DL (ref 6.4–8.2)
RBC # BLD AUTO: 4.61 X10(6)UL
SODIUM SERPL-SCNC: 140 MMOL/L (ref 136–145)
TRIGL SERPL-MCNC: 80 MG/DL (ref 30–149)
TSI SER-ACNC: 3.09 MIU/ML (ref 0.36–3.74)
VLDLC SERPL CALC-MCNC: 12 MG/DL (ref 0–30)
WBC # BLD AUTO: 10.4 X10(3) UL (ref 4–11)

## 2023-01-27 PROCEDURE — 36415 COLL VENOUS BLD VENIPUNCTURE: CPT

## 2023-01-27 PROCEDURE — 85025 COMPLETE CBC W/AUTO DIFF WBC: CPT

## 2023-01-27 PROCEDURE — 84443 ASSAY THYROID STIM HORMONE: CPT

## 2023-01-27 PROCEDURE — 80053 COMPREHEN METABOLIC PANEL: CPT

## 2023-01-27 PROCEDURE — 80061 LIPID PANEL: CPT

## 2023-02-01 NOTE — PATIENT INSTRUCTIONS
You were seen in clinic today for follow-up. Today, we reviewed your blood work and your cholesterol levels have improved from the last check. Today, we focused on your recent lab results. Overall this has improved from 2021  - Plan to keep up with exercise as able  - Continue optimizing nutrition as best as you can  - Let us obtain x-rays of the knees. We recommended  -Would recommend initial trial of conservative therapy:    -Acetaminophen 500-650 mg every 4-6 hours as needed for pain relief  -Ibuprofen 200-400 mg every 8 hours as needed for anti-inflammatory and pain relief  -For more severe pain, will try cyclobenzaprine 5mg. Take first at nighttime as this can cause sleepiness, drowsiness. If tolerated well, can use 3 times a day on an as-needed basis.   Be careful with driving or operating heavy machinery on this medication  -Trial of physical therapy    We will initiate a trial of Mounjaro: 0.5 mL injection once a week for 4 weeks  - We discussed potential side effects of this medication including upset stomach, abdominal pain, pancreatitis  - Try to minimize use of alcohol while on this medication to reduce the risk of pancreatitis  - This is an adjunct of therapy that should be combined with good nutrition and exercise habits      Return to clinic in 8 months for annual physical examination

## 2023-02-02 ENCOUNTER — OFFICE VISIT (OUTPATIENT)
Dept: INTERNAL MEDICINE CLINIC | Facility: CLINIC | Age: 43
End: 2023-02-02

## 2023-02-02 VITALS
TEMPERATURE: 98 F | DIASTOLIC BLOOD PRESSURE: 74 MMHG | BODY MASS INDEX: 45.01 KG/M2 | OXYGEN SATURATION: 100 % | HEART RATE: 78 BPM | SYSTOLIC BLOOD PRESSURE: 122 MMHG | HEIGHT: 64 IN | WEIGHT: 263.63 LBS

## 2023-02-02 DIAGNOSIS — M25.562 CHRONIC PAIN OF BOTH KNEES: ICD-10-CM

## 2023-02-02 DIAGNOSIS — R93.0 ABNORMAL MRI OF HEAD: ICD-10-CM

## 2023-02-02 DIAGNOSIS — F41.1 GENERALIZED ANXIETY DISORDER: ICD-10-CM

## 2023-02-02 DIAGNOSIS — E78.1 HYPERTRIGLYCERIDEMIA: Primary | ICD-10-CM

## 2023-02-02 DIAGNOSIS — M25.561 CHRONIC PAIN OF BOTH KNEES: ICD-10-CM

## 2023-02-02 DIAGNOSIS — G89.29 CHRONIC PAIN OF BOTH KNEES: ICD-10-CM

## 2023-02-02 PROCEDURE — 3078F DIAST BP <80 MM HG: CPT | Performed by: INTERNAL MEDICINE

## 2023-02-02 PROCEDURE — 99214 OFFICE O/P EST MOD 30 MIN: CPT | Performed by: INTERNAL MEDICINE

## 2023-02-02 PROCEDURE — 3074F SYST BP LT 130 MM HG: CPT | Performed by: INTERNAL MEDICINE

## 2023-02-02 PROCEDURE — 3008F BODY MASS INDEX DOCD: CPT | Performed by: INTERNAL MEDICINE

## 2023-02-02 RX ORDER — TIRZEPATIDE 2.5 MG/.5ML
0.5 INJECTION, SOLUTION SUBCUTANEOUS WEEKLY
Qty: 2 ML | Refills: 0 | Status: SHIPPED | OUTPATIENT
Start: 2023-02-02 | End: 2023-03-04

## 2023-02-24 ENCOUNTER — PATIENT MESSAGE (OUTPATIENT)
Dept: INTERNAL MEDICINE CLINIC | Facility: CLINIC | Age: 43
End: 2023-02-24

## 2023-02-24 NOTE — TELEPHONE ENCOUNTER
From: Jo Young  To: Betsy Vera MD  Sent: 2/24/2023 7:53 AM CST  Subject: Malu Crooked! I took my 4th shot this morning. So far no side effects and I've lost 6 pounds! Should we go up a dose? This was the last pen of the 2.5. Thank you.

## 2023-03-22 ENCOUNTER — TELEPHONE (OUTPATIENT)
Dept: INTERNAL MEDICINE CLINIC | Facility: CLINIC | Age: 43
End: 2023-03-22

## 2023-03-27 RX ORDER — TIRZEPATIDE 5 MG/.5ML
INJECTION, SOLUTION SUBCUTANEOUS
Qty: 2 ML | Refills: 0 | OUTPATIENT
Start: 2023-03-27

## 2023-03-27 RX ORDER — TIRZEPATIDE 7.5 MG/.5ML
7.5 INJECTION, SOLUTION SUBCUTANEOUS WEEKLY
Qty: 2 ML | Refills: 0 | Status: SHIPPED | OUTPATIENT
Start: 2023-03-27 | End: 2023-04-26

## 2023-04-04 ENCOUNTER — PATIENT MESSAGE (OUTPATIENT)
Dept: OBGYN CLINIC | Facility: CLINIC | Age: 43
End: 2023-04-04

## 2023-04-04 RX ORDER — ACETAMINOPHEN AND CODEINE PHOSPHATE 120; 12 MG/5ML; MG/5ML
0.35 SOLUTION ORAL DAILY
Qty: 84 TABLET | Refills: 2 | Status: SHIPPED | OUTPATIENT
Start: 2023-04-04

## 2023-04-04 NOTE — TELEPHONE ENCOUNTER
From: Bobo Sutton  To: Tello Villarreal MD  Sent: 4/4/2023 10:09 AM CDT  Subject: Prescription     Hello,  My insurance is now requiring I refill my birth control via the mail delivery option instead of [facility] or [facility]. Can you please send that order to [facility]? [facility] just [facility].

## 2023-05-20 NOTE — PATIENT INSTRUCTIONS
You are seen in clinic today for follow-up of weight management. We have been doing well on the higher doses of Mounjaro, and we should proceed to increase the medication further. Lets continue with Mounjaro 10 mg injection once a week for the next 4 weeks  - Send us a [employer] message in about 3 weeks prior to next refill to determine if we can increase the dose further.  -It is reasonable to hold on the same dosage to see if this is sufficient. We did place refills at the 10 mg weekly injections if needed. Keep up the good work with optimizing nutrition and exercising as tolerated    Your next Pap smear will be due this year with Dr. Doug Donohue, please make an appointment around August    We did discuss the importance of stopping smoking, notify us if you are need of assistance in stopping smoking in the future    Return to clinic in 5 months for annual physical examination. Congratulations on your great progress thus far, keep up the good work!

## 2023-05-22 ENCOUNTER — OFFICE VISIT (OUTPATIENT)
Dept: INTERNAL MEDICINE CLINIC | Facility: CLINIC | Age: 43
End: 2023-05-22

## 2023-05-22 VITALS
BODY MASS INDEX: 40.63 KG/M2 | WEIGHT: 238 LBS | HEART RATE: 92 BPM | OXYGEN SATURATION: 98 % | DIASTOLIC BLOOD PRESSURE: 76 MMHG | HEIGHT: 64 IN | SYSTOLIC BLOOD PRESSURE: 110 MMHG | TEMPERATURE: 99 F

## 2023-05-22 DIAGNOSIS — Z72.0 TOBACCO USE: ICD-10-CM

## 2023-05-22 DIAGNOSIS — F41.1 GENERALIZED ANXIETY DISORDER: ICD-10-CM

## 2023-05-22 DIAGNOSIS — E78.1 HYPERTRIGLYCERIDEMIA: Primary | ICD-10-CM

## 2023-05-22 PROCEDURE — 99213 OFFICE O/P EST LOW 20 MIN: CPT | Performed by: INTERNAL MEDICINE

## 2023-05-22 PROCEDURE — 3008F BODY MASS INDEX DOCD: CPT | Performed by: INTERNAL MEDICINE

## 2023-05-22 PROCEDURE — 3078F DIAST BP <80 MM HG: CPT | Performed by: INTERNAL MEDICINE

## 2023-05-22 PROCEDURE — 3074F SYST BP LT 130 MM HG: CPT | Performed by: INTERNAL MEDICINE

## 2023-05-22 RX ORDER — TIRZEPATIDE 10 MG/.5ML
10 INJECTION, SOLUTION SUBCUTANEOUS WEEKLY
Qty: 2 ML | Refills: 2 | Status: SHIPPED | OUTPATIENT
Start: 2023-05-22

## 2023-05-24 ENCOUNTER — PATIENT MESSAGE (OUTPATIENT)
Dept: INTERNAL MEDICINE CLINIC | Facility: CLINIC | Age: 43
End: 2023-05-24

## 2023-05-25 NOTE — TELEPHONE ENCOUNTER
Patient called for status  She takes once a week, will be taking again tomorrow   Please call to advise  Tasked to Dr Veda Ramirez

## 2023-05-26 RX ORDER — TIRZEPATIDE 5 MG/.5ML
5 INJECTION, SOLUTION SUBCUTANEOUS WEEKLY
Qty: 2 ML | Refills: 0 | Status: SHIPPED | OUTPATIENT
Start: 2023-05-26

## 2023-06-21 ENCOUNTER — PATIENT MESSAGE (OUTPATIENT)
Dept: INTERNAL MEDICINE CLINIC | Facility: CLINIC | Age: 43
End: 2023-06-21

## 2023-06-22 RX ORDER — TIRZEPATIDE 12.5 MG/.5ML
12.5 INJECTION, SOLUTION SUBCUTANEOUS WEEKLY
Qty: 2 ML | Refills: 0 | Status: SHIPPED | OUTPATIENT
Start: 2023-06-22

## 2023-06-22 RX ORDER — TIRZEPATIDE 12.5 MG/.5ML
12.5 INJECTION, SOLUTION SUBCUTANEOUS WEEKLY
Qty: 2 ML | Refills: 0 | Status: SHIPPED | OUTPATIENT
Start: 2023-06-22 | End: 2023-06-22

## 2023-06-22 NOTE — TELEPHONE ENCOUNTER
Patient asked that medication be sent to [facility] on [address]. Since insurance no longer covers at [facility], medication was sent to [facility] and cancelled at [facility]. My chart message sent to patient.

## 2023-07-24 ENCOUNTER — PATIENT MESSAGE (OUTPATIENT)
Dept: INTERNAL MEDICINE CLINIC | Facility: CLINIC | Age: 43
End: 2023-07-24

## 2023-07-24 RX ORDER — TIRZEPATIDE 12.5 MG/.5ML
12.5 INJECTION, SOLUTION SUBCUTANEOUS WEEKLY
Qty: 2 ML | Refills: 1 | Status: SHIPPED | OUTPATIENT
Start: 2023-07-24

## 2023-07-24 NOTE — TELEPHONE ENCOUNTER
From: Rose Hunt  To: Kanchan George MD  Sent: 7/24/2023 4:32 PM CDT  Subject[facility]    I received the attached message from the pharmacy on the refill just sent.

## 2023-07-24 NOTE — TELEPHONE ENCOUNTER
Refill request is for a maintenance medication and has met the criteria specified in the Ambulatory Medication Refill Standing Order for eligibility, visits, laboratory, alerts and was sent to the requested pharmacy. Requested Prescriptions     Signed Prescriptions Disp Refills    Tirzepatide (MOUNJARO) 12.5 MG/0.5ML Subcutaneous Solution Pen-injector 2 mL 1     Sig: Inject 12.5 mg into the skin once a week.      Authorizing Provider: Stepan Medina     Ordering User: Lien Honeycutt

## 2023-07-25 ENCOUNTER — TELEPHONE (OUTPATIENT)
Dept: INTERNAL MEDICINE CLINIC | Facility: CLINIC | Age: 43
End: 2023-07-25

## 2023-07-25 NOTE — TELEPHONE ENCOUNTER
----- Message from Danii Hansen sent at 7/24/2023  4:53 PM CDT -----  Regarding: [facility]  Contact: [phone]  Thanks- just talked to them they do need an authorization

## 2023-07-27 NOTE — TELEPHONE ENCOUNTER
Patient is calling to check the status of the prior authorization below. Diaz Greenper out of the office all week, is there somewhere else this request should be sent or okay to wait for her return? Patient is currently out of the medication. And is supposed to take her next tablet tomorrow morning.

## 2023-07-27 NOTE — TELEPHONE ENCOUNTER
Awaiting fax. Will update pt as soon as possible.  See [facility] 7/24, pt aware this medication can be filled at out-of-pocket cost.

## 2023-07-28 NOTE — TELEPHONE ENCOUNTER
Patient called back for status. Patient talked to [facility]. They said they have not received anything from us.      Please call [facility] [phone]

## 2023-07-28 NOTE — TELEPHONE ENCOUNTER
Prior Authorization request rec'd from [facility] for:  [facility]  Form requires completion   Fax placed in purple folder  Tasked to [employer]

## 2023-07-31 NOTE — TELEPHONE ENCOUNTER
Patient is calling she received notification that the PA has been denied    Patient is asking for a call back with the next steps    Phone [phone]

## 2023-08-03 NOTE — TELEPHONE ENCOUNTER
Patient is calling checking on the status of a returned call. Patient states she has been out of her medication for over 2 weeks.     Please call and advise

## 2023-08-07 ENCOUNTER — PATIENT MESSAGE (OUTPATIENT)
Dept: INTERNAL MEDICINE CLINIC | Facility: CLINIC | Age: 43
End: 2023-08-07

## 2023-08-07 DIAGNOSIS — E78.1 HYPERTRIGLYCERIDEMIA: ICD-10-CM

## 2023-08-07 DIAGNOSIS — E88.81 METABOLIC SYNDROME: Primary | ICD-10-CM

## 2023-08-07 NOTE — TELEPHONE ENCOUNTER
Spoke to pt - we discussed she has started the appeal for [facility];    [facility]JYOTHI SANDOVAL and Uri Rouse are the alternatives for wt loss;   she will check insurance  She has had great success with wt loss so far

## 2023-08-09 NOTE — TELEPHONE ENCOUNTER
Received request for scheduling a peer to peer for Wendy Herrera [facility]

## 2023-08-10 NOTE — TELEPHONE ENCOUNTER
[facility] faxed Prior Authorization Request Form for Mounjaro Inj 12.5/0.5    Placed in purple folder

## 2023-08-11 NOTE — TELEPHONE ENCOUNTER
Received another fax requesting a prior authorization for Mounjaro Injection 12.5/0.5    Patient's ID # [id number]  Placed in [facility]

## 2023-08-14 NOTE — TELEPHONE ENCOUNTER
I am happy to report that Tess Cooper has excellent vision and ocular health for his age! Tess no longer needs eye exams with me, Dr. Vázquez. I am graduating him to our healthy eyes clinic with my partner, Dr. Krupa Ritter.     Dr. Ritter will monitor Tess and optimize his visual development. She can also prescribe glasses and contact lenses if the need should arise.    To [employer]

## 2023-08-18 NOTE — TELEPHONE ENCOUNTER
To Cash Vega,    See PA denial info below:     Denied   8/17/2023 10:51 AM  Case ID: [id number] Appeal supported: No  Note from payer: Request Reference Number: [id number]. WEGOVY INJ 0.5MG is denied for not meeting the prior authorization requirement(s). Details of this decision are in the notice attached below or have been faxed to you.    Payer: [facility]

## 2023-08-21 NOTE — TELEPHONE ENCOUNTER
[facility] faxing request for additional information for PA  [facility]    Placed in purple folder

## 2023-08-22 ENCOUNTER — TELEPHONE (OUTPATIENT)
Dept: INTERNAL MEDICINE CLINIC | Facility: CLINIC | Age: 43
End: 2023-08-22

## 2023-08-22 ENCOUNTER — OFFICE VISIT (OUTPATIENT)
Dept: INTERNAL MEDICINE CLINIC | Facility: CLINIC | Age: 43
End: 2023-08-22

## 2023-08-22 VITALS
HEIGHT: 64 IN | OXYGEN SATURATION: 100 % | BODY MASS INDEX: 38.04 KG/M2 | HEART RATE: 90 BPM | WEIGHT: 222.81 LBS | DIASTOLIC BLOOD PRESSURE: 70 MMHG | TEMPERATURE: 98 F | SYSTOLIC BLOOD PRESSURE: 110 MMHG

## 2023-08-22 DIAGNOSIS — Z13.1 SCREENING FOR DIABETES MELLITUS: ICD-10-CM

## 2023-08-22 DIAGNOSIS — E88.81 METABOLIC SYNDROME: Primary | ICD-10-CM

## 2023-08-22 DIAGNOSIS — Z13.0 SCREENING FOR DEFICIENCY ANEMIA: ICD-10-CM

## 2023-08-22 DIAGNOSIS — Z13.29 SCREENING FOR THYROID DISORDER: ICD-10-CM

## 2023-08-22 DIAGNOSIS — F41.1 GENERALIZED ANXIETY DISORDER: ICD-10-CM

## 2023-08-22 DIAGNOSIS — E78.1 HYPERTRIGLYCERIDEMIA: ICD-10-CM

## 2023-08-22 PROCEDURE — 3078F DIAST BP <80 MM HG: CPT | Performed by: INTERNAL MEDICINE

## 2023-08-22 PROCEDURE — 3008F BODY MASS INDEX DOCD: CPT | Performed by: INTERNAL MEDICINE

## 2023-08-22 PROCEDURE — 3074F SYST BP LT 130 MM HG: CPT | Performed by: INTERNAL MEDICINE

## 2023-08-22 PROCEDURE — 99214 OFFICE O/P EST MOD 30 MIN: CPT | Performed by: INTERNAL MEDICINE

## 2023-08-22 NOTE — PATIENT INSTRUCTIONS
You are seen in clinic today for further management of weight loss. Unfortunately, Daryl barroso is now being covered by your insurance any further. We will try [facility] as an alternative. However, there is very limited supply of this medication in which some patients have been having difficulties with refills    As you have tolerated Mounjaro with good effects, we did go over the requirements for your insurance  - Medically, we qualify  - We did have today's office visit within 30-day timeframe  - We also included medical diagnoses that should qualify you for [facility]  - To improve our chances, we will try the intermediate dose of 1.7 mg every week. We prescribed this for 3 months  - Please notify us if you have difficulty with obtaining this medication    Monitor for any side effects including abdominal pain, nausea, vomiting, diarrhea    We will place fasting blood work to be completed prior to your next visit which will be your annual physical examination. Please fast for 12 hours and we will notify you of the lab results. You are making fantastic progress even without the use of medications at this time. Keep up the great work with optimizing nutrition, staying active.

## 2023-08-22 NOTE — TELEPHONE ENCOUNTER
ePA submitted for [facility]JYOTHI SANDOVAL.     Waiting for Payer Response   8/22/2023  3:50 PM  Case ID: [id number]

## 2023-08-23 NOTE — TELEPHONE ENCOUNTER
PA Approved. Authorization number: [id number]   Authorized from August 22, 2023 to February 22, 2024       Pt updated via [employer].

## 2023-09-25 NOTE — TELEPHONE ENCOUNTER
[facility] faxing requesting additional information for PA  Mounjaro Injection    Placed in red folder

## 2023-10-18 ENCOUNTER — TELEPHONE (OUTPATIENT)
Dept: INTERNAL MEDICINE CLINIC | Facility: CLINIC | Age: 43
End: 2023-10-18

## 2023-10-18 ENCOUNTER — PATIENT MESSAGE (OUTPATIENT)
Dept: INTERNAL MEDICINE CLINIC | Facility: CLINIC | Age: 43
End: 2023-10-18

## 2023-10-18 DIAGNOSIS — E88.810 METABOLIC SYNDROME: Primary | ICD-10-CM

## 2023-10-18 DIAGNOSIS — E78.1 HYPERTRIGLYCERIDEMIA: ICD-10-CM

## 2023-10-20 RX ORDER — LORAZEPAM 1 MG/1
1 TABLET ORAL EVERY 12 HOURS PRN
Qty: 60 TABLET | Refills: 3 | Status: SHIPPED | OUTPATIENT
Start: 2023-10-20

## 2023-10-20 NOTE — TELEPHONE ENCOUNTER
To MD:  The above refill request is for a controlled substance. Please review pended medication order. Print and sign for staff to fax to pharmacy or prescribe electronically.     Last office visit: 8/22/23  Last time refill sent and quantity/refills: 10/12/22 qty 60 plus 3

## 2023-10-23 NOTE — TELEPHONE ENCOUNTER
Cancellation notice received from [facility] re:  [facility]JYOTHI GIMENEZ     The medication was previously approved on [id number]  from 8/22/23 - 2/22/24    Fax placed in red folder

## 2023-11-11 RX ORDER — ACETAMINOPHEN AND CODEINE PHOSPHATE 120; 12 MG/5ML; MG/5ML
0.35 SOLUTION ORAL DAILY
Qty: 84 TABLET | Refills: 3 | OUTPATIENT
Start: 2023-11-11

## 2023-11-13 DIAGNOSIS — E78.1 HYPERTRIGLYCERIDEMIA: ICD-10-CM

## 2023-11-13 DIAGNOSIS — E88.810 METABOLIC SYNDROME: ICD-10-CM

## 2023-11-13 NOTE — TELEPHONE ENCOUNTER
Patient is calling to request a script for [facility] YRN SANDOVAL for a 90 day supply    Patient states starting January 1, 2024 her Ins will no longer cover Wegovy out of pocket  Patient would like to get the medication for a 3 month supply

## 2024-01-26 ENCOUNTER — OFFICE VISIT (OUTPATIENT)
Dept: INTERNAL MEDICINE CLINIC | Facility: CLINIC | Age: 44
End: 2024-01-26
Payer: COMMERCIAL

## 2024-01-26 VITALS
HEIGHT: 64 IN | HEART RATE: 85 BPM | SYSTOLIC BLOOD PRESSURE: 106 MMHG | RESPIRATION RATE: 16 BRPM | OXYGEN SATURATION: 98 % | WEIGHT: 201 LBS | DIASTOLIC BLOOD PRESSURE: 78 MMHG | TEMPERATURE: 98 F | BODY MASS INDEX: 34.31 KG/M2

## 2024-01-26 DIAGNOSIS — Z13.0 SCREENING FOR DEFICIENCY ANEMIA: ICD-10-CM

## 2024-01-26 DIAGNOSIS — Z12.11 SCREENING FOR COLON CANCER: ICD-10-CM

## 2024-01-26 DIAGNOSIS — Z13.220 SCREENING FOR LIPOID DISORDERS: ICD-10-CM

## 2024-01-26 DIAGNOSIS — Z12.31 ENCOUNTER FOR SCREENING MAMMOGRAM FOR MALIGNANT NEOPLASM OF BREAST: ICD-10-CM

## 2024-01-26 DIAGNOSIS — Z00.00 ANNUAL PHYSICAL EXAM: Primary | ICD-10-CM

## 2024-01-26 DIAGNOSIS — R68.84 JAW PAIN: ICD-10-CM

## 2024-01-26 DIAGNOSIS — E88.810 METABOLIC SYNDROME: ICD-10-CM

## 2024-01-26 DIAGNOSIS — R92.30 DENSE BREAST: ICD-10-CM

## 2024-01-26 DIAGNOSIS — Z72.0 TOBACCO USE: ICD-10-CM

## 2024-01-26 DIAGNOSIS — E78.1 HYPERTRIGLYCERIDEMIA: ICD-10-CM

## 2024-01-26 DIAGNOSIS — Z01.419 WELL WOMAN EXAM: ICD-10-CM

## 2024-01-26 DIAGNOSIS — Z13.29 SCREENING FOR THYROID DISORDER: ICD-10-CM

## 2024-01-26 DIAGNOSIS — Z13.1 SCREENING FOR DIABETES MELLITUS: ICD-10-CM

## 2024-01-26 PROCEDURE — 99396 PREV VISIT EST AGE 40-64: CPT | Performed by: INTERNAL MEDICINE

## 2024-01-26 PROCEDURE — 3008F BODY MASS INDEX DOCD: CPT | Performed by: INTERNAL MEDICINE

## 2024-01-26 PROCEDURE — 3078F DIAST BP <80 MM HG: CPT | Performed by: INTERNAL MEDICINE

## 2024-01-26 PROCEDURE — 3074F SYST BP LT 130 MM HG: CPT | Performed by: INTERNAL MEDICINE

## 2024-01-26 RX ORDER — TIRZEPATIDE 5 MG/.5ML
12.5 INJECTION, SOLUTION SUBCUTANEOUS WEEKLY
Qty: 2 ML | Refills: 3 | Status: SHIPPED | OUTPATIENT
Start: 2024-01-26

## 2024-01-26 RX ORDER — AMOXICILLIN 500 MG/1
500 TABLET, FILM COATED ORAL 3 TIMES DAILY
COMMUNITY
Start: 2024-01-16 | End: 2024-01-26 | Stop reason: ALTCHOICE

## 2024-01-26 RX ORDER — BENZONATATE 100 MG/1
100 CAPSULE ORAL 3 TIMES DAILY
COMMUNITY
Start: 2024-01-02

## 2024-01-26 NOTE — H&P
Rosemary Sinha is a 43 year old female.    HPI:     Chief Complaint   Patient presents with    Physical     AP, C/O Lt ear jaw pain s/sp wisdom tooth removal, Pap 11/21/22     Ms. SINHA is a 43 year old female coming in for new patient annual physical examination    Last Weds had 2 wisdom teeth removed. Was seen in , was treated with Abx. L ear and part of the throat. Have COVID infection, some cough.      Had concern for hard lumps underneath. Comes and goes. 1.5 weeks. Frequency twice a year.    Stress with moving her mother from [place]. 830 - 6 am.    I reviewed and updated the PMHx, FamHx, medications, allergies, and SocHx as below with the patient    OB/GYN last seen by Dr. Jose méndez seen 10/14/2021.   Last menstrual period: on it, very lite.     SocHX  - Home: feels safe at home; with ,  - Work: feels safe at work;  for 5/3 [employer]. Remotely   - Sexual Activity: with    - Hobbies: golf, shopping, mobile games,   - Nutrition: salads, cheese, lentil chili, salmon, potatoes. Fruits. Drink water   - Physical Activity: golfing, swimming.       HISTORY:  Past Medical History:   Diagnosis Date    Anxiety       Past Surgical History:   Procedure Laterality Date    WISDOM TEETH REMOVED      2021      Family History   Problem Relation Age of Onset    Cancer Father         Lung    Obesity Sister     Breast Cancer Maternal Grandmother         post    Stroke Maternal Grandmother     Cancer Maternal Grandmother         Breast Cancer, Lung Cancer      Social History:   Social History     Socioeconomic History    Marital status:    Tobacco Use    Smoking status: Every Day     Packs/day: 1.00     Years: 22.00     Additional pack years: 0.00     Total pack years: 22.00     Types: Cigarettes    Smokeless tobacco: Never    Tobacco comments:     1 ppd, 22 years   Vaping Use    Vaping Use: Never used   Substance and Sexual Activity    Alcohol use: Yes     Alcohol/week: 10.0 standard drinks of  alcohol     Types: 10 Glasses of wine per week     Comment: 10 glasses of wine    Drug use: Never    Sexual activity: Yes     Birth control/protection: OCP        Medications (Active prior to today's visit):  Current Outpatient Medications   Medication Sig Dispense Refill    semaglutide-weight management 2.4 MG/0.75ML Subcutaneous Solution Auto-injector Inject 0.75 mL (2.4 mg total) into the skin once a week. 9 mL 0    LORazepam 1 MG Oral Tab Take 1 tablet (1 mg total) by mouth every 12 (twelve) hours as needed. 60 tablet 3    Norethindrone 0.35 MG Oral Tab Take 1 tablet (0.35 mg total) by mouth daily. 84 tablet 2    benzonatate 100 MG Oral Cap Take 1 capsule (100 mg total) by mouth 3 (three) times daily. (Patient not taking: Reported on 1/26/2024)         Allergies:  Allergies   Allergen Reactions    Avocado NAUSEA ONLY and OTHER (SEE COMMENTS)     Abdominal pain         ROS:   Positive Findings indicated in BOLD    Constitutional: Fever, Chills, Weight Gain, Weight Loss, Night Sweats, Fatigue, Malaise  ENT/Mouth:  Hearing Changes, Ear Pain, Nasal Congestion, Sinus Pain, Hoarseness, Sore throat, Rhinorrhea, Swallowing Difficulty, Jaw pain   Eyes: Eye Pain, Swelling, Redness, Foreign Body, Discharge, Vision Changes  Cardiovascular: Chest Pain, SOB, PND, Dyspnea on Exertion, Orthopnea, Claudication, Edema, Palpitations  Respiratory: Cough, Sputum, Wheezing, Shortness of breath  Gastrointestinal: Nausea, Vomiting, Diarrhea, Constipation, Pain, Heartburn, Dysphagia, Bloody stools, Tarry stools  Genitourinary: Dysmenorrhea, Dysuria, Urinary Frequency, Hematuria, Urinary Incontinence, Urgency,  Flank Pain  Musculoskeletal: Arthralgias, Myalgias, Joint Swelling, Joint Stiffness, Back Pain, Neck Pain  Integumentary: Skin Lesions, Pruritis, Hair Changes, Jaundice, Nail changes  Neuro: Weakness, Numbness, Paresthesias, Loss of Consciousness, Syncope, Dizziness, Headache, Falls  Psych: Anxiety, Depression, Insomnia, Suicidal  Ideation, Homicidal ideation, Memory Changes  Heme/Lymph: Bruising, Bleeding, Lymphadenopathy  Endocrine: Polyuria, Polydipsia, Temperature Intolerance    PHYSICAL EXAM:   Vital Signs:  Blood pressure 106/78, pulse 85, temperature 98.1 °F (36.7 °C), temperature source Tympanic, resp. rate 16, height 5' 4\" (1.626 m), weight 201 lb (91.2 kg), last menstrual period 01/09/2024, SpO2 98%.     Constitutional: No acute distress. Alert and oriented x 3.  Eyes: EOMI, PERRLA, clear sclera b/l  HENT: NCAT, Moist mucous membranes, Oropharynx without erythema or exudates  Neck: No JVD, no thyromegaly  Cardiovascular: S1, S2, no S3, no S4, Regular rate and rhythm, No murmurs/gallops/rubs.   Respiratory: Clear to auscultation bilaterally.  No wheezes/rales/rhonchi  Gastrointestinal: Soft, nontender, nondistended. Positive bowel sounds x 4. No rebound tenderness. No hepatomegaly, No splenomegaly  Genitourinary: No CVA tenderness bilaterally  Neurologic: No focal neurological deficits, CN II-XII intact, light touch intact, MSK Strength 5/5 and symmetric in all extremities, normal gait, 2+ patellar tendon  Musculoskeletal: Full range of motion of all extremities, no clubbing/swelling/edema  Skin: No lesions, No erythema, no jaundice, Cap Refill < 2s  Psychiatric: Appropriate mood and affect  Heme/Lymph/Immune: No cervical LAD    OBGYN - per OBGYN - Dr. Garcia    DATA REVIEWED   Labs:  Recent Results (from the past 8760 hour(s))   CBC W/ DIFFERENTIAL    Collection Time: 01/27/23  7:43 AM   Result Value Ref Range    WBC 10.4 4.0 - 11.0 x10(3) uL    RBC 4.61 3.80 - 5.30 x10(6)uL    HGB 14.1 12.0 - 16.0 g/dL    HCT 42.3 35.0 - 48.0 %    MCV 91.8 80.0 - 100.0 fL    MCH 30.6 26.0 - 34.0 pg    MCHC 33.3 31.0 - 37.0 g/dL    RDW-SD 44.7 35.1 - 46.3 fL    RDW 13.2 11.0 - 15.0 %    .0 150.0 - 450.0 10(3)uL    Neutrophil Absolute Prelim 6.79 1.50 - 7.70 x10 (3) uL    Neutrophil Absolute 6.79 1.50 - 7.70 x10(3) uL    Lymphocyte Absolute  2.56 1.00 - 4.00 x10(3) uL    Monocyte Absolute 0.57 0.10 - 1.00 x10(3) uL    Eosinophil Absolute 0.35 0.00 - 0.70 x10(3) uL    Basophil Absolute 0.06 0.00 - 0.20 x10(3) uL    Immature Granulocyte Absolute 0.04 0.00 - 1.00 x10(3) uL    Neutrophil % 65.4 %    Lymphocyte % 24.7 %    Monocyte % 5.5 %    Eosinophil % 3.4 %    Basophil % 0.6 %    Immature Granulocyte % 0.4 %     *Note: Due to a large number of results and/or encounters for the requested time period, some results have not been displayed. A complete set of results can be found in Results Review.       Recent Results (from the past 8760 hour(s))   Comp Metabolic Panel (14)    Collection Time: 01/27/23  7:43 AM   Result Value Ref Range    Glucose 102 (H) 70 - 99 mg/dL    Sodium 140 136 - 145 mmol/L    Potassium 4.2 3.5 - 5.1 mmol/L    Chloride 107 98 - 112 mmol/L    CO2 26.0 21.0 - 32.0 mmol/L    Anion Gap 7 0 - 18 mmol/L    BUN 10 7 - 18 mg/dL    Creatinine 0.59 0.55 - 1.02 mg/dL    BUN/CREA Ratio 16.9 10.0 - 20.0    Calcium, Total 8.9 8.5 - 10.1 mg/dL    Calculated Osmolality 289 275 - 295 mOsm/kg    eGFR-Cr 115 >=60 mL/min/1.73m2    ALT 31 13 - 56 U/L    AST 16 15 - 37 U/L    Alkaline Phosphatase 84 37 - 98 U/L    Bilirubin, Total 0.5 0.1 - 2.0 mg/dL    Total Protein 7.2 6.4 - 8.2 g/dL    Albumin 3.7 3.4 - 5.0 g/dL    Globulin  3.5 2.8 - 4.4 g/dL    A/G Ratio 1.1 1.0 - 2.0    Patient Fasting for CMP? Yes      *Note: Due to a large number of results and/or encounters for the requested time period, some results have not been displayed. A complete set of results can be found in Results Review.       Cholesterol, Total (mg/dL)   Date Value   01/27/2023 134     HDL Cholesterol (mg/dL)   Date Value   01/27/2023 47     LDL Cholesterol (mg/dL)   Date Value   01/27/2023 71     Triglycerides (mg/dL)   Date Value   01/27/2023 80       Last A1c value was  % done  .    MRI brain 3/14/2022  IMPRESSION:   9 x 5 mm area of nonenhancing low T1, increased T2 and FLAIR  signal noted   in the right basal ganglia (series 8, image 17).  No associated volume   loss.  This is nonspecific and may represent a single focus of chronic   demyelination/gliosis, focally dilated perivascular space or less likely   remote lacunar infarction.   The rest of the brain parenchyma appears unremarkable.      Chest x-ray 6/17/2022  IMPRESSION:      Normal heart size.  No mediastinal widening or adenopathy.  No lung   consolidation or effusions.      Mammogram 11/22/2022  Impression   CONCLUSION:   There is no mammographic evidence of malignancy in either breast. As long as patient's clinical breast exam remains unchanged, annual screening mammogram is recommended.       BI-RADS CATEGORY:     DIAGNOSTIC CATEGORY 1--NEGATIVE ASSESSMENT.         Pathology:  Pap smear 8/24/2020  Cytologic Interpretation :          Negative for intraepithelial lesion or malignancy.            Satisfactory for evaluation.  Presence of endocervical/transformation zone     component.        Interpretation     Aptima HPV HIGH RISK (TYPES 16,18,31,33,35,39,45,51,52,56,58,59,66,68):     NEGATIVE         ASSESSMENT/PLAN:     Jaw pain  Interval removal of left mandibular back molar.  Expected gap in tissue.  But no overt infection appreciated  - May still be some recovery inflammation that seems to respond well to NSAIDs  - Will continue with NSAIDs for now, hold Abx  - If no improvement through the weekend, check a Panorex of the left jaw to evaluate for abscess.    Skin nodule  Localized to the neck.  None apparent at this time  -Possible lymphadenopathy, recent COVID infection but seems to have resolved.  Will monitor for now    Abnormal intracranial imaging  Nonspecific signal changes of the right basal ganglia, was evaluated by neurology Dr. Bradford with plans for repeat MRI brain in 1-2 years. - Otherwise remains asymptomatic     Hypertriglyceridemia   ASCVD 1.4%, lipid panel within normal limits  - Complemented patient on her  great progress.  Continue with regular weight loss over time  -Check fasting lipid panel     Generalized anxiety disorder  Seems to be well controlled  - Lorazepam as needed     Encounter for weight management  Patient interested in trying to get the weight down over time with Mounjaro.  This is in addition to targeted weight loss with nutrition and exercise  -Congratulated the patient on her great progress.   -Recall she was maximized on Mounjaro but no longer covered by insurance.  Switch to Wegovy, but again no longer covered by insurance.  She is requesting that we try Mounjaro to see if this is covered again which is reasonable.  She has had great progress with that and hopefully can be continued  - We discussed phentermine as an alternative medication.  - She is doing a great job with optimizing nutrition even while off the medication.  She is exercising from time to time  - Hoping that the medication can be covered so she can continue her good progress with weight loss.    .       Orders This Visit:  No orders of the defined types were placed in this encounter.      Meds This Visit:  Requested Prescriptions      No prescriptions requested or ordered in this encounter       Imaging & Referrals:  [facility] 2D+3D SCREENING BILAT (CPT=77067/61113)       Health Maintenance  HTN Screen: At goal  DM Screen: Check fasting sugar  HLD Screen: Check fasting lipid panel  Osteoporosis Screen (>65 or < 65 with FRAX > 9.3%): Not indicated  HCV Screen: Considered low risk  HIV Screen: considered low risk  G/C/Syphilis: Considered low risk    Colon Cancer Screening (45-70): Not indicated  Breast Cancer Screening (40-70): Order given for mammogram  Cervical Cancer Screening (21-64): Last Pap smear 2020, due in 2023 - referral for Dr. Garcia for well woman exams  Lung Cancer Screening (55-79 with 30 p/year and active < 15 years): Not indicated    Influenza: Annually  Td/Tdap: Last Tdap 2022, due 2032  Zoster (50+): Not  indicated  HPV (19-26): Not indicated  Pneumococcal: UTD    Immunization History   Administered Date(s) Administered    Covid-19 Vaccine Moderna 100 mcg/0.5 ml 03/26/2021, 04/24/2021    Covid-19 Vaccine Moderna 50 Mcg/0.25 Ml 12/02/2021    FLUZONE 6 months and older PFS 0.5 ml (05465) 09/18/2017    Pneumovax 23 02/17/2016    TDAP 08/11/2014, 02/05/2022         Return to clinic in 6 months for follow-up    John Cerda MD, 01/26/24, 12:27 PM

## 2024-01-26 NOTE — PATIENT INSTRUCTIONS
- You were seen in clinic for regular annual check-up. We have ordered labs for you and we will call you with the results. Please obtain the bloodwork fasting for 12 hours. OK to drink water the day of your blood draw.    We have the lab downstairs on the first floor.  No appointment is necessary.  Lab hours are Monday-Friday 7:30 AM to 4:45 PM.  There may be Saturday hours 7 AM-11:00 AM as well.     Otherwise you can obtain the blood tests on the weekends at the main hospital or local immediate care/urgent care within the [facility] system.    -Keep up the great work with targeting weight loss over time, medication certainly helping, but the optimizing nutrition, exercising as able is foundation of targeting weight loss  - Lets keep a close eye on the bumps of the neck.  This is likely lymph nodes from inflammatory response.  Possibly related to your recent infection, as well as the wisdom teeth removal.  - We will continue with NSAIDs such as ibuprofen, naproxen on an as-needed basis for anti-inflammatory effects.  - If no improvement through the weekend, we may obtain a Panorex which is an x-ray of the jaw area.  Look out for any bony abnormalities, fluid buildup.  Thankfully, no abscess or infection that we see right now, but this should be evaluated further with imaging if needed.  -We will try to send the Mounjaro at your prior dose.  It is possible that these injectable medications are no longer covered by her insurance.  We did discuss phentermine as an appetite suppressant medication as an alternative.  - Please make an appointment for mammogram.  - We discussed the importance of stopping smoking to avoid the risk for lung cancer, accelerated heart disease, tobacco related cancers.  -Vaccines you may be due for: Flu shot, COVID dose #4  - Please continue to eat a varied diet including recommended servings of vegetables, fruits, and low fat dairy. Minimize high saturated fats (such as fast foods) and  high sugar intake (such as soda)  - We recommend 150 minutes of moderate intensity exercise (brisk walking, swimming) weekly to maintain your current weight.  Targeted weight loss will require more vigorous exercise or more than 150 minutes/week.    Return to clinic in 6 months for follow-up

## 2024-02-12 ENCOUNTER — TELEPHONE (OUTPATIENT)
Dept: URGENT CARE | Age: 44
End: 2024-02-12

## 2024-03-08 ENCOUNTER — TELEPHONE (OUTPATIENT)
Dept: INTERNAL MEDICINE CLINIC | Facility: CLINIC | Age: 44
End: 2024-03-08

## 2024-03-08 DIAGNOSIS — E78.1 HYPERTRIGLYCERIDEMIA: ICD-10-CM

## 2024-03-08 DIAGNOSIS — E88.810 METABOLIC SYNDROME: ICD-10-CM

## 2024-03-08 RX ORDER — SEMAGLUTIDE 2.4 MG/.75ML
1.5 INJECTION, SOLUTION SUBCUTANEOUS WEEKLY
Qty: 9 ML | Refills: 0 | OUTPATIENT
Start: 2024-03-08

## 2024-03-08 RX ORDER — ACETAMINOPHEN AND CODEINE PHOSPHATE 120; 12 MG/5ML; MG/5ML
0.35 SOLUTION ORAL DAILY
Qty: 84 TABLET | Refills: 3 | OUTPATIENT
Start: 2024-03-08

## 2024-03-08 NOTE — TELEPHONE ENCOUNTER
Requested Prescriptions     Pending Prescriptions Disp Refills    NORETHINDRONE 0.35 MG Oral Tab [Pharmacy Med Name: NORETHINDRONE TAB 0.35MG A1] 84 tablet 3     Sig: TAKE 1 TABLET BY MOUTH DAILY     Last annual 11/21/22  Last filled 4/4/23 x 9 mo  Pap DUE AT ANNUAL  Mammo OVERDUE since 11/22/23    Next annual DUE. Mychart sent.

## 2024-03-08 NOTE — TELEPHONE ENCOUNTER
Refill request has failed the Ambulatory Medication Refill Standing Order and is routed to the primary physician to review the following:    Requested Prescriptions     Refused Prescriptions Disp Refills    WEGOVY 2.4 MG/0.75ML Subcutaneous Solution Auto-injector [Pharmacy Med Name: WEGOVY  2.4MG 0.75ML SOLUTION PEN-INJECTOR  PEN] 9 mL 0     Sig: INJECT THE CONTENTS OF ONE PEN  SUBCUTANEOUSLY WEEKLY AS  DIRECTED     Refused By: FUNMILAYO ARMIJO     Reason for Refusal: Refill not appropriate

## 2024-03-12 ENCOUNTER — OFFICE VISIT (OUTPATIENT)
Dept: OBGYN CLINIC | Facility: CLINIC | Age: 44
End: 2024-03-12
Payer: COMMERCIAL

## 2024-03-12 VITALS
HEART RATE: 88 BPM | DIASTOLIC BLOOD PRESSURE: 78 MMHG | BODY MASS INDEX: 33.57 KG/M2 | SYSTOLIC BLOOD PRESSURE: 110 MMHG | HEIGHT: 64 IN | WEIGHT: 196.63 LBS

## 2024-03-12 DIAGNOSIS — Z12.4 SCREENING FOR CERVICAL CANCER: ICD-10-CM

## 2024-03-12 DIAGNOSIS — Z01.419 WELL WOMAN EXAM WITH ROUTINE GYNECOLOGICAL EXAM: Primary | ICD-10-CM

## 2024-03-12 PROCEDURE — 3078F DIAST BP <80 MM HG: CPT | Performed by: OBSTETRICS & GYNECOLOGY

## 2024-03-12 PROCEDURE — 3074F SYST BP LT 130 MM HG: CPT | Performed by: OBSTETRICS & GYNECOLOGY

## 2024-03-12 PROCEDURE — 3008F BODY MASS INDEX DOCD: CPT | Performed by: OBSTETRICS & GYNECOLOGY

## 2024-03-12 PROCEDURE — 99396 PREV VISIT EST AGE 40-64: CPT | Performed by: OBSTETRICS & GYNECOLOGY

## 2024-03-12 RX ORDER — ACETAMINOPHEN AND CODEINE PHOSPHATE 120; 12 MG/5ML; MG/5ML
0.35 SOLUTION ORAL DAILY
Qty: 84 TABLET | Refills: 4 | Status: SHIPPED | OUTPATIENT
Start: 2024-03-12

## 2024-03-12 NOTE — PROGRESS NOTES
Rosemary Sinha is a 43 year old female  Patient's last menstrual period was 2024.   Chief Complaint   Patient presents with    Gyn Exam     ANNUAL EXAM, MAMMO ORDER, OCP REFILL   Presenting for well woman exam. Last pap smear was normal 2020. Last mammogram was normal 2022 with repeat scheduled.     OBSTETRICS HISTORY:  OB History    Para Term  AB Living   0 0 0 0 0 0   SAB IAB Ectopic Multiple Live Births   0 0 0 0 0       GYNE HISTORY:  Patient's last menstrual period was 2024.    History   Sexual Activity    Sexual activity: Yes    Birth control/ protection: OCP        Pap Result Notes: 1 1/2 OR 2 YRS NORMAL PER PATIENT      MEDICAL HISTORY:  Past Medical History:   Diagnosis Date    Anxiety          SURGICAL HISTORY:  Past Surgical History:   Procedure Laterality Date    WISDOM TEETH REMOVED             SOCIAL HISTORY:  Social History     Socioeconomic History    Marital status:      Spouse name: Not on file    Number of children: Not on file    Years of education: Not on file    Highest education level: Not on file   Occupational History    Not on file   Tobacco Use    Smoking status: Every Day     Packs/day: 1.00     Years: 22.00     Additional pack years: 0.00     Total pack years: 22.00     Types: Cigarettes    Smokeless tobacco: Never    Tobacco comments:     1 ppd, 22 years   Vaping Use    Vaping Use: Never used   Substance and Sexual Activity    Alcohol use: Yes     Alcohol/week: 10.0 standard drinks of alcohol     Types: 10 Glasses of wine per week     Comment: 10 glasses of wine    Drug use: Never    Sexual activity: Yes     Birth control/protection: OCP   Other Topics Concern    Not on file   Social History Narrative    Not on file     Social Determinants of Health     Financial Resource Strain: Not on file   Food Insecurity: Not on file   Transportation Needs: Not on file   Physical Activity: Not on file   Stress: Not on file   Social Connections: Not  on file   Housing Stability: Not on file         Depression Screening (PHQ-2/PHQ-9): Over the LAST 2 WEEKS   Little interest or pleasure in doing things (over the last two weeks)?: Not at all    Feeling down, depressed, or hopeless (over the last two weeks)?: Not at all    PHQ-2 SCORE: 0           MEDICATIONS:    Current Outpatient Medications:     Norethindrone 0.35 MG Oral Tab, Take 1 tablet (0.35 mg total) by mouth daily., Disp: 84 tablet, Rfl: 4    LORazepam 1 MG Oral Tab, Take 1 tablet (1 mg total) by mouth every 12 (twelve) hours as needed., Disp: 60 tablet, Rfl: 3    ALLERGIES:    Allergies   Allergen Reactions    Avocado NAUSEA ONLY and OTHER (SEE COMMENTS)     Abdominal pain         Review of Systems:  Review of Systems   All other systems reviewed and are negative.       Vitals:    03/12/24 0842   BP: 110/78   Pulse: 88       PHYSICAL EXAM:   Physical Exam  Vitals reviewed.   Constitutional:       Appearance: Normal appearance.   HENT:      Head: Atraumatic.   Eyes:      Pupils: Pupils are equal, round, and reactive to light.   Pulmonary:      Effort: Pulmonary effort is normal.   Chest:   Breasts:     Right: Normal. No bleeding, inverted nipple, mass, nipple discharge, skin change or tenderness.      Left: Normal. No bleeding, inverted nipple, mass, nipple discharge, skin change or tenderness.   Abdominal:      General: Abdomen is flat.      Palpations: Abdomen is soft.      Tenderness: There is no abdominal tenderness.   Genitourinary:     General: Normal vulva.      Exam position: Lithotomy position.      Labia:         Right: No rash, tenderness, lesion or injury.         Left: No rash, tenderness, lesion or injury.       Vagina: Normal.      Cervix: Normal.      Uterus: Normal. Not tender.       Adnexa: Right adnexa normal and left adnexa normal.        Right: No tenderness or fullness.          Left: No tenderness or fullness.     Lymphadenopathy:      Upper Body:      Right upper body: No  supraclavicular, axillary or pectoral adenopathy.      Left upper body: No supraclavicular, axillary or pectoral adenopathy.   Skin:     General: Skin is warm and dry.   Neurological:      General: No focal deficit present.      Mental Status: She is alert and oriented to person, place, and time.   Psychiatric:         Mood and Affect: Mood normal.         Behavior: Behavior normal.         Thought Content: Thought content normal.         Judgment: Judgment normal.           Assessment & Plan:  Rosemary was seen today for gyn exam.    Diagnoses and all orders for this visit:    Well woman exam with routine gynecological exam    Screening for cervical cancer  -     ThinPrep PAP Smear; Future  -     Hpv Dna  High Risk , Thin Prep Collect; Future  -     Hpv Dna  High Risk , Thin Prep Collect  -     ThinPrep PAP Smear    Other orders  -     Norethindrone 0.35 MG Oral Tab; Take 1 tablet (0.35 mg total) by mouth daily.        Requested Prescriptions     Signed Prescriptions Disp Refills    Norethindrone 0.35 MG Oral Tab 84 tablet 4     Sig: Take 1 tablet (0.35 mg total) by mouth daily.       Pap w/ HPV done. ASSCP guidelines reviewed.  Annual exams encouraged. Call if any abnormal vaginal bleeding.  Mammogram ordered by PCP.  SBE encouraged. OCP refill sent.

## 2024-03-13 LAB — HPV I/H RISK 1 DNA SPEC QL NAA+PROBE: NEGATIVE

## 2024-03-25 LAB
CYTOLOGY CVX/VAG DOC THIN PREP: NORMAL
HPV16+18+45 E6+E7MRNA CVX NAA+PROBE: NEGATIVE

## 2024-04-01 RX ORDER — ACETAMINOPHEN AND CODEINE PHOSPHATE 120; 12 MG/5ML; MG/5ML
SOLUTION ORAL
Qty: 84 TABLET | Refills: 3 | OUTPATIENT
Start: 2024-04-01

## 2024-04-01 NOTE — TELEPHONE ENCOUNTER
Requested Prescriptions     Pending Prescriptions Disp Refills    NORETHINDRONE 0.35 MG Oral Tab [Pharmacy Med Name: NORETHINDRONE 0.35 MG TABLET] 84 tablet 3     Sig: TAKE 1 TABLET (0.35MG) BY MOUTH EVERY DAY     Last annual 3/12/24  Last filled 3/12/24 x 1 year- too soon to fill.

## 2024-05-14 ENCOUNTER — OFFICE VISIT (OUTPATIENT)
Dept: FAMILY MEDICINE CLINIC | Facility: CLINIC | Age: 44
End: 2024-05-14

## 2024-05-14 VITALS
TEMPERATURE: 97 F | HEIGHT: 64 IN | DIASTOLIC BLOOD PRESSURE: 82 MMHG | BODY MASS INDEX: 34.59 KG/M2 | WEIGHT: 202.63 LBS | RESPIRATION RATE: 18 BRPM | SYSTOLIC BLOOD PRESSURE: 128 MMHG | HEART RATE: 80 BPM | OXYGEN SATURATION: 98 %

## 2024-05-14 DIAGNOSIS — J20.8 VIRAL BRONCHITIS: Primary | ICD-10-CM

## 2024-05-14 PROCEDURE — 99213 OFFICE O/P EST LOW 20 MIN: CPT | Performed by: PHYSICIAN ASSISTANT

## 2024-05-14 PROCEDURE — 3079F DIAST BP 80-89 MM HG: CPT | Performed by: PHYSICIAN ASSISTANT

## 2024-05-14 PROCEDURE — 3008F BODY MASS INDEX DOCD: CPT | Performed by: PHYSICIAN ASSISTANT

## 2024-05-14 PROCEDURE — 3074F SYST BP LT 130 MM HG: CPT | Performed by: PHYSICIAN ASSISTANT

## 2024-05-14 RX ORDER — ALBUTEROL SULFATE 90 UG/1
2 AEROSOL, METERED RESPIRATORY (INHALATION) EVERY 4 HOURS PRN
Qty: 1 EACH | Refills: 0 | Status: SHIPPED | OUTPATIENT
Start: 2024-05-14

## 2024-05-14 NOTE — PROGRESS NOTES
CHIEF COMPLAINT:     Chief Complaint   Patient presents with    Cough     6 days w/ cough, voice loss and runny nose.        HPI:   Rosemary Sinha is a 43 year old female who presents for upper respiratory symptoms for  6 days. Patient reports  runny nose, productive cough, laryngitis . Symptoms have been about the same since onset.  Treating symptoms with ibuprofen.  Reports some wheezing when laying down at night.  No fever, chills, HA, dizziness. No GI sx. .      Current Outpatient Medications   Medication Sig Dispense Refill    Norethindrone 0.35 MG Oral Tab Take 1 tablet (0.35 mg total) by mouth daily. 84 tablet 4    LORazepam 1 MG Oral Tab Take 1 tablet (1 mg total) by mouth every 12 (twelve) hours as needed. 60 tablet 3      Past Medical History:    Anxiety      Past Surgical History:   Procedure Laterality Date    [place] teeth removed      2021         Social History     Socioeconomic History    Marital status:    Tobacco Use    Smoking status: Every Day     Current packs/day: 1.00     Average packs/day: 1 pack/day for 22.0 years (22.0 ttl pk-yrs)     Types: Cigarettes    Smokeless tobacco: Never    Tobacco comments:     1 ppd, 22 years   Vaping Use    Vaping status: Never Used   Substance and Sexual Activity    Alcohol use: Yes     Alcohol/week: 10.0 standard drinks of alcohol     Types: 10 Glasses of wine per week     Comment: 10 glasses of wine    Drug use: Never    Sexual activity: Yes     Birth control/protection: OCP     Social Determinants of Health     Transportation Needs: Unknown (8/24/2020)    Received from [facility]    PRAPARE - Transportation     Lack of Transportation (Medical): Patient declined     Lack of Transportation (Non-Medical): Patient declined   Physical Activity: Unknown (8/24/2020)    Received from [facility]    Exercise Vital Sign     Days of Exercise per Week: 0 days     Minutes of Exercise per  Session: Patient declined         REVIEW OF SYSTEMS:   GENERAL: ok appetite  SKIN: no rashes or abnormal skin lesions  HEENT: See HPI  LUNGS: See HPI  CARDIOVASCULAR: denies chest pain or palpitations   GI: denies N/V/C or abdominal pain      EXAM:   /82   Pulse 80   Temp 97.3 °F (36.3 °C)   Resp 18   Ht 5' 4\" (1.626 m)   Wt 202 lb 9.6 oz (91.9 kg)   LMP 01/09/2024   SpO2 98%   BMI 34.78 kg/m²   GENERAL: well developed, well nourished,in no apparent distress  SKIN: no rashes,no suspicious lesions  HEAD: atraumatic, normocephalic.    EYES: conjunctiva clear, EOM intact  EARS: TM's non injected, no bulging, no retraction,no fluid, bony landmarks visible  NOSE: Nostrils patent, mild, clear nasal discharge, nasal mucosa mildly edematous, moderately erytehmatous   THROAT: Oral mucosa pink, moist. Posterior pharynx is non erythematous. No exudates. Tonsils 0/4.    NECK: Supple, non-tender  LUNGS:  Breathing is non labored. No cough during visit.  Good aeration throughout all lung fields, very faint and rare exp wheezes on left.   CARDIO: RRR without murmur  EXTREMITIES: no cyanosis, clubbing or edema  LYMPH:  No cervical or submandibular lymphadenopathy.        ASSESSMENT AND PLAN:   Rosemary Sinha is a 43 year old female who presents with upper respiratory symptoms that are consistent with    ASSESSMENT:   Encounter Diagnosis   Name Primary?    Viral bronchitis Yes       PLAN: Mucinex, fluids, rest.  Albuterol as below.  Patient inquired about COVID testing, patient is already on day 6 of illness and afebrile, offered to run COVID testing but advised treatment plan etc would not change. Patient okay forgoing COVID testing at this time.     Comfort care as described in Patient Instructions    Meds & Refills for this Visit:  Requested Prescriptions     Signed Prescriptions Disp Refills    albuterol 108 (90 Base) MCG/ACT Inhalation Aero Soln 1 each 0     Sig: Inhale 2 puffs into the lungs every 4 (four) hours  as needed for Wheezing or Shortness of Breath.     Risks, benefits, and side effects of medication explained and discussed.    The patient indicates understanding of these issues and agrees to the plan.  The patient is asked to f/u with PCP if sx's persist or worsen.

## 2024-05-23 ENCOUNTER — PATIENT MESSAGE (OUTPATIENT)
Dept: INTERNAL MEDICINE CLINIC | Facility: CLINIC | Age: 44
End: 2024-05-23

## 2024-05-23 NOTE — TELEPHONE ENCOUNTER
From: Rosemary Sinha  To: John Cerda  Sent: 5/23/2024 9:43 AM CDT  Subject: Mounjaro or Ozempic    Good morning. I would like to revisit starting back up on Mounjaro if possible. I have stalled with my weight loss since the Wegovy stopped being covered under my insurance. It looks like the other 2 options that are similar and covered under my plan are the Mounjaro which I was on before and the Ozempic.

## 2024-05-24 RX ORDER — TIRZEPATIDE 12.5 MG/.5ML
12.5 INJECTION, SOLUTION SUBCUTANEOUS WEEKLY
Qty: 2 ML | Refills: 3 | Status: SHIPPED | OUTPATIENT
Start: 2024-05-24 | End: 2024-06-15

## 2024-05-28 RX ORDER — TIRZEPATIDE 12.5 MG/.5ML
12.5 INJECTION, SOLUTION SUBCUTANEOUS WEEKLY
Qty: 2 ML | Refills: 3 | OUTPATIENT
Start: 2024-05-28 | End: 2024-06-19

## 2024-05-28 NOTE — TELEPHONE ENCOUNTER
Current refill request refused due to refill is either a duplicate request or has active refills at the pharmacy.  Check previous templates.    Requested Prescriptions     Refused Prescriptions Disp Refills    Tirzepatide (MOUNJARO) 12.5 MG/0.5ML Subcutaneous Solution Pen-injector 2 mL 3     Sig: Inject 12.5 mg into the skin once a week for 4 doses.     Refused By: MARIBELL SALDIVAR     Reason for Refusal: Duplicate refill request

## 2024-06-06 ENCOUNTER — TELEPHONE (OUTPATIENT)
Dept: INTERNAL MEDICINE CLINIC | Facility: CLINIC | Age: 44
End: 2024-06-06

## 2024-06-06 NOTE — TELEPHONE ENCOUNTER
To Lissette  Please assist    Prior authorization needed for Mounjaro 12.5 mg     [phone]     Patient ID# [id number]     Fax placed in purple folder

## 2024-06-06 NOTE — TELEPHONE ENCOUNTER
Prior authorization needed for Mounjaro 12.5 mg    [phone]    Patient ID# [id number]    Fax placed in purple folder

## 2024-06-10 NOTE — TELEPHONE ENCOUNTER
Fax received from [employer]    This patient's pharmacy coverage has   Please contact the insurance plan at the number on the back of the patient's most current insurance card    Fax placed in red folder

## 2024-08-08 ENCOUNTER — PATIENT MESSAGE (OUTPATIENT)
Dept: INTERNAL MEDICINE CLINIC | Facility: CLINIC | Age: 44
End: 2024-08-08

## 2024-08-09 ENCOUNTER — TELEPHONE (OUTPATIENT)
Dept: INTERNAL MEDICINE CLINIC | Facility: CLINIC | Age: 44
End: 2024-08-09

## 2024-08-09 NOTE — TELEPHONE ENCOUNTER
----- Message from [employer]  sent at 8/8/2024  3:35 PM CDT -----  Regarding: Bug bites  Contact: [phone]  I'm sure you've been hearing this but the mites that are biting in the area after the cicadas left are fierce!  I have about 16 bites that are huge!  Would you recommend anything other than benadryl and cream?

## 2024-08-09 NOTE — TELEPHONE ENCOUNTER
From: Rosemary Sinha  To: John Cerda  Sent: 8/8/2024 3:35 PM CDT  Subject: Bug bites    I'm sure you've been hearing this but the mites that are biting in the area after the cicadas left are fierce! I have about 16 bites that are huge! Would you recommend anything other than benadryl and cream?

## 2024-09-10 ENCOUNTER — PATIENT MESSAGE (OUTPATIENT)
Dept: INTERNAL MEDICINE CLINIC | Facility: CLINIC | Age: 44
End: 2024-09-10

## 2024-09-10 NOTE — TELEPHONE ENCOUNTER
From: Rosemary Sinha  Sent: 9/10/2024 7:23 AM CDT  To: Raine Castaneda [facility] Clinical Staff  Subject: Zepbound Refill    I have completed the 2.5, 5 and 7.5.

## 2024-09-12 RX ORDER — TIRZEPATIDE 10 MG/.5ML
10 INJECTION, SOLUTION SUBCUTANEOUS WEEKLY
Qty: 6 ML | Refills: 1 | Status: SHIPPED | OUTPATIENT
Start: 2024-09-12 | End: 2024-10-04

## 2024-10-02 NOTE — PROGRESS NOTES
RHEUMATOLOGY CLINIC- NEW PATIENT    Rosemary Sinha is a 44 year old female.    ASSESSMENT/PLAN:       ICD-10-CM    1. Carpal tunnel syndrome of right wrist  G56.01       2. Neuropathy  G62.9         DISCUSSION:  Patient presents as a new outpatient for right hand pain suspicious for an underlying neuropathy in the setting of positive prayer sign.  Additionally, would keep in mind positive Finkelstein testing as well with possible tendinopathy.  However, patient reports predominant numbness/burning sensation along her wrist to her thumb.  We discussed possible treatments of carpal tunnel syndrome including: Conservative management with bracing, possible trial of gabapentin, versus CSI.  Patient prefers conservative management for now, therefore, carpal tunnel brace given in office today.    PLAN:  -Carpal tunnel brace nightly for 2 weeks.  If symptoms persist, discussed with patient to increase to daytime use as tolerates  - Could consider a trial of gabapentin as well.  Patient to contact office if interested in CSI  - Consult/evaluation communicated with referring physician/provider.    Patient may follow-up as needed    Marilyn Figueroa DO  10/2/2024   4:26 PM    HPI:     10/2/2024 Initial Consult: I had the pleasure of seeing Rosemary Sinha on 10/2/2024 as a new outpatient consultation for hand pain. The patient was originally referred by her PCP.     44 year old female w/ unremarkable PMH who presents to clinic today.  Patient states that starting about 3 months ago, started noticing a \"tingling, burning \"sensation along her right thumb and the dorsal aspect of her wrist.  Stretching and \"shaking it off \"seems to slightly help symptoms.  This is worse at night and with flexion type movements.  She is right-handed and admits to frequent typing/computer work.    Current Medications:  PRN Tylenol/ibuprofen-ineffective    Medication History:  N/A    Interval History:   See Above    HISTORY:  Past Medical History:     Anxiety      Social Hx Reviewed   Family Hx Reviewed     Medications (Active prior to today's visit):  Current Outpatient Medications   Medication Sig Dispense Refill    Tirzepatide-Weight Management (ZEPBOUND) 10 MG/0.5ML Subcutaneous Solution Auto-injector Inject 10 mg into the skin once a week for 4 doses. 6 mL 1    LORAZEPAM 1 MG Oral Tab TAKE 1 TABLET (1 MG TOTAL) BY MOUTH EVERY 12 (TWELVE) HOURS AS NEEDED. 60 tablet 3    albuterol 108 (90 Base) MCG/ACT Inhalation Aero Soln Inhale 2 puffs into the lungs every 4 (four) hours as needed for Wheezing or Shortness of Breath. 1 each 0    Norethindrone 0.35 MG Oral Tab Take 1 tablet (0.35 mg total) by mouth daily. 84 tablet 4       Allergies:  Allergies   Allergen Reactions    Avocado NAUSEA ONLY and OTHER (SEE COMMENTS)     Abdominal pain         ROS:   Review of Systems   Constitutional:  Negative for chills and fever.   HENT:  Negative for congestion, hearing loss, mouth sores, nosebleeds and trouble swallowing.    Eyes:  Negative for photophobia, pain, redness and visual disturbance.   Respiratory:  Negative for cough and shortness of breath.    Cardiovascular:  Negative for chest pain, palpitations and leg swelling.   Gastrointestinal:  Negative for abdominal pain, blood in stool, diarrhea and nausea.   Endocrine: Negative for cold intolerance and heat intolerance.   Genitourinary:  Negative for dysuria, frequency and hematuria.   Musculoskeletal:  Positive for arthralgias. Negative for back pain, gait problem, joint swelling, myalgias, neck pain and neck stiffness.   Skin:  Negative for color change and rash.   Neurological:  Positive for numbness. Negative for dizziness, weakness and headaches.   Psychiatric/Behavioral:  Negative for confusion and dysphoric mood.         PHYSICAL EXAM:     Constitutional:  Well developed, Well nourished, No acute distress  HENT:  Normocephalic, Atraumatic, Bilateral external ears normal, Oropharynx moist, No oral  exudates.  Neck: Normal range of motion, No tenderness, Supple, No stridor.  Eyes:  PERRL, EOMI, Conjunctiva normal, No discharge.  Respiratory:  Normal breath sounds, No respiratory distress, No wheezing.  Cardiovascular:  Normal heart rate, Normal rhythm, No murmurs, No rubs, No gallops.  GI:  Bowel sounds normal, Soft, No tenderness, No masses, No pulsatile masses.  : No CVA tenderness.  Musculoskeletal: Positive right-sided prayer sign.  Positive right-sided Finkelstein testing.  A comprehensive 28 count joint exam was done and was negative for swelling or tenderness except as noted. Inspections for misalignment, asymmetry, crepitation, defects, tenderness, masses, nodules, effusions, range of motion, and stability in the upper and lower extremities bilaterally are all normal unless noted.           Integument:  Warm, Dry, No erythema, No rash.  Lymphatic:  No lymphadenopathy noted.  Neurologic:  Alert & oriented x 3, Normal motor function, Normal sensory function, No focal deficits noted.  Psychiatric:  Affect normal, Judgment normal, Mood normal.    LABS:     Prior lab work reviewed and notable for:     1/27/2023:  TSH 3.090 WNL  CMP with BUN 10, CR 0.59, LFTs nonelevated, no gamma gap noted  CBC without cytopenias or leukocytosis    Imaging:       N/A

## 2024-10-03 ENCOUNTER — OFFICE VISIT (OUTPATIENT)
Dept: RHEUMATOLOGY | Facility: CLINIC | Age: 44
End: 2024-10-03
Payer: COMMERCIAL

## 2024-10-03 VITALS
DIASTOLIC BLOOD PRESSURE: 71 MMHG | BODY MASS INDEX: 33.03 KG/M2 | RESPIRATION RATE: 16 BRPM | WEIGHT: 193.5 LBS | HEART RATE: 90 BPM | HEIGHT: 64 IN | SYSTOLIC BLOOD PRESSURE: 105 MMHG

## 2024-10-03 DIAGNOSIS — G56.01 CARPAL TUNNEL SYNDROME OF RIGHT WRIST: Primary | ICD-10-CM

## 2024-10-03 DIAGNOSIS — G62.9 NEUROPATHY: ICD-10-CM

## 2024-10-03 PROCEDURE — 99244 OFF/OP CNSLTJ NEW/EST MOD 40: CPT | Performed by: INTERNAL MEDICINE

## 2024-10-03 PROCEDURE — 3008F BODY MASS INDEX DOCD: CPT | Performed by: INTERNAL MEDICINE

## 2024-10-03 PROCEDURE — L3908 WHO COCK-UP NONMOLDE PRE OTS: HCPCS | Performed by: INTERNAL MEDICINE

## 2024-10-03 PROCEDURE — 3074F SYST BP LT 130 MM HG: CPT | Performed by: INTERNAL MEDICINE

## 2024-10-03 PROCEDURE — 3078F DIAST BP <80 MM HG: CPT | Performed by: INTERNAL MEDICINE

## 2024-12-09 ENCOUNTER — APPOINTMENT (OUTPATIENT)
Dept: URBAN - METROPOLITAN AREA CLINIC 244 | Age: 44
Setting detail: DERMATOLOGY
End: 2024-12-09

## 2024-12-09 DIAGNOSIS — L73.8 OTHER SPECIFIED FOLLICULAR DISORDERS: ICD-10-CM

## 2024-12-09 DIAGNOSIS — L72.0 EPIDERMAL CYST: ICD-10-CM

## 2024-12-09 DIAGNOSIS — L71.8 OTHER ROSACEA: ICD-10-CM

## 2024-12-09 DIAGNOSIS — L82.1 OTHER SEBORRHEIC KERATOSIS: ICD-10-CM

## 2024-12-09 PROBLEM — D23.72 OTHER BENIGN NEOPLASM OF SKIN OF LEFT LOWER LIMB, INCLUDING HIP: Status: ACTIVE | Noted: 2024-12-09

## 2024-12-09 PROCEDURE — OTHER DIAGNOSIS COMMENT: OTHER

## 2024-12-09 PROCEDURE — 99203 OFFICE O/P NEW LOW 30 MIN: CPT

## 2024-12-09 PROCEDURE — OTHER COUNSELING: OTHER

## 2024-12-09 ASSESSMENT — LOCATION ZONE DERM
LOCATION ZONE: FACE
LOCATION ZONE: TRUNK

## 2024-12-09 ASSESSMENT — LOCATION SIMPLE DESCRIPTION DERM
LOCATION SIMPLE: RIGHT CHEEK
LOCATION SIMPLE: LEFT CHEEK
LOCATION SIMPLE: ABDOMEN

## 2024-12-09 ASSESSMENT — LOCATION DETAILED DESCRIPTION DERM
LOCATION DETAILED: RIGHT INFERIOR CENTRAL MALAR CHEEK
LOCATION DETAILED: RIGHT RIB CAGE
LOCATION DETAILED: LEFT SUPERIOR CENTRAL MALAR CHEEK
LOCATION DETAILED: RIGHT CENTRAL MALAR CHEEK
LOCATION DETAILED: LEFT INFERIOR MEDIAL MALAR CHEEK

## 2024-12-09 NOTE — HPI: RASH
How Severe Is Your Rash?: mild
Is This A New Presentation, Or A Follow-Up?: Rash
Additional History: No recent treatments, also c/o white bumps

## 2024-12-09 NOTE — PROCEDURE: DIAGNOSIS COMMENT
Comment: Discussed treating with laser
Detail Level: Simple
Render Risk Assessment In Note?: no
Comment: saw derm years ago and was told it was normal. has not changed.

## 2024-12-26 ENCOUNTER — TELEPHONE (OUTPATIENT)
Dept: INTERNAL MEDICINE CLINIC | Facility: CLINIC | Age: 44
End: 2024-12-26

## 2024-12-26 DIAGNOSIS — F41.1 GENERALIZED ANXIETY DISORDER: ICD-10-CM

## 2024-12-26 RX ORDER — LORAZEPAM 1 MG/1
1 TABLET ORAL EVERY 12 HOURS PRN
Qty: 60 TABLET | Refills: 3 | Status: SHIPPED | OUTPATIENT
Start: 2024-12-26

## 2024-12-26 NOTE — TELEPHONE ENCOUNTER
To MD:  The above refill request is for a controlled substance.  Please review pended medication order.   Print and sign for staff to fax to pharmacy or prescribe electronically.    Last office visit: 1/26/2024  Last time refill sent and quantity/refills: 6/21/2024 #60 with 3 refills

## 2025-01-02 ENCOUNTER — TELEPHONE (OUTPATIENT)
Dept: INTERNAL MEDICINE CLINIC | Facility: CLINIC | Age: 45
End: 2025-01-02

## 2025-01-02 NOTE — TELEPHONE ENCOUNTER
Pill Pack coreen GIMENEZ request Zepbound 10 mg / 0.5 ml pen    ID [id number]  Phone [phone]    Placed in purple folder

## 2025-01-04 ENCOUNTER — PATIENT MESSAGE (OUTPATIENT)
Dept: INTERNAL MEDICINE CLINIC | Facility: CLINIC | Age: 45
End: 2025-01-04

## 2025-01-06 RX ORDER — TIRZEPATIDE 7.5 MG/.5ML
7.5 INJECTION, SOLUTION SUBCUTANEOUS WEEKLY
Qty: 2 ML | Refills: 1 | Status: SHIPPED | OUTPATIENT
Start: 2025-01-06

## 2025-01-06 RX ORDER — TIRZEPATIDE 5 MG/.5ML
5 INJECTION, SOLUTION SUBCUTANEOUS WEEKLY
Qty: 2 ML | Refills: 0 | Status: SHIPPED | OUTPATIENT
Start: 2025-01-06 | End: 2025-01-28

## 2025-01-06 NOTE — TELEPHONE ENCOUNTER
See mychart;  EPA has been started;   check referral tab   Zepbound Rxs sent to [facility]

## 2025-01-06 NOTE — TELEPHONE ENCOUNTER
Donovan to pt - we have been on the insurance issues for a year;  at this point, one more attempt only

## 2025-01-30 ENCOUNTER — TELEPHONE (OUTPATIENT)
Dept: INTERNAL MEDICINE CLINIC | Facility: CLINIC | Age: 45
End: 2025-01-30

## 2025-01-30 RX ORDER — VARENICLINE TARTRATE 1 MG/1
TABLET, FILM COATED ORAL
Qty: 52 TABLET | Refills: 0 | Status: CANCELLED | OUTPATIENT
Start: 2025-01-30 | End: 2025-03-01

## 2025-02-26 NOTE — PATIENT INSTRUCTIONS
- You were seen in clinic for regular annual check-up. We have ordered labs for you and we will call you with the results. Please obtain the bloodwork fasting for 12 hours. OK to drink water the day of your blood draw.    We have the lab downstairs on the first floor.  No appointment is necessary.  Lab hours are Monday-Friday 7:30 AM to 4:45 PM.  There may be Saturday hours 7 AM-11:00 AM as well.     Otherwise you can obtain the blood tests on the weekends at the main hospital or local immediate care/urgent care within the [facility].    -Keep up the great work with targeting weight loss over time, medication certainly helping, but the optimizing nutrition, exercising as able is foundation of targeting weight loss      - Obtain a great job with targeting weight loss.  Hopefully phentermine is a good alternative to continue with our health goals  - Please make an appointment for mammogram.  -Continue following with OB/GYN, Dr. Garcia  - We discussed the importance of stopping smoking to avoid the risk for lung cancer, accelerated heart disease, tobacco related cancers.    -Recommended gradual weaning off of cigarettes by decreasing 1-2 cigarettes every 1-2 weeks.  -We should also try to avoid any triggering places, people, situations that may increase cravings  -We also discussed nicotine replacement therapy     Use according to the following 12-week dosing schedule:    Weeks 1 to 6: Chew 1 piece of gum every 1 to 2 hours (maximum: 24 pieces/day); to increase chances of quitting, chew at least 9 pieces/day during the first 6 weeks.    Weeks 7 to 9: Chew 1 piece of gum every 2 to 4 hours (maximum: 24 pieces/day).    Weeks 10 to 12: Chew 1 piece of gum every 4 to 8 hours (maximum: 24 pieces/day).    Will proceed with Wellbutrin 150 mg once a day. This will help reduce cravings    -Patient has agreed to the above plan and will implement today, and we will try to follow-up through MyChart/phone call in 1  month, consider clinic visit in 3 months.    -Vaccines you may be due for: Flu shot, COVID dose #4, Prevnar 20/pneumonia shot  - Please continue to eat a varied diet including recommended servings of vegetables, fruits, and low fat dairy. Minimize high saturated fats (such as fast foods) and high sugar intake (such as soda)  - We recommend 150 minutes of moderate intensity exercise (brisk walking, swimming) weekly to maintain your current weight.  Targeted weight loss will require more vigorous exercise or more than 150 minutes/week.    Return to clinic in 6 months for follow-up    Quitting Smoking    Quitting smoking is the most important step you can take to improve your health. We're glad you have set a goal to improve your health.    Quit Smoking Resources    In addition to medications, use the STAR plan to help you successfully quit.   Stick with your quit date!   Tell friends, family, and coworkers your quit date. Request their understanding and support.  Anticipate and prepare for challenges. Some examples are withdrawal symptoms, being around others who smoke, and drinking alcohol.  Remove all tobacco products and paraphernalia from your environment. Make your home and vehicles smoke-free.    Free resources for additional support:  National tobacco quitline: 1-800-QUIT-NOW (1-704.690.8244).  SmokefreeTXT is a free text program to assist you in quitting. Visit https://www.smokefree.gov/smokefreetxt for more information.  Feel free to call your care manager at ([phone]) for additional support.

## 2025-02-26 NOTE — H&P
Rosemary Sinha is a 44 year old female.    HPI:     No chief complaint on file.    Ms. SINHA is a 44 year old female PMHx anxiety who presents today for annual physical examination    Stopped the Zepbound due to insurance coverage. She is on the second week of resuming Zepbound is tolerating it well.     L ankle has been sore.     6 hours of sleep. No anxiety nor depression.    I reviewed and updated the PMHx, FamHx, medications, allergies, and SocHx as below with the patient    OB/GYN last seen by Dr. Garcia   Last menstrual period: on OCP    SocHX  - Home: feels safe at home; with ,  - Work: feels safe at work;  for 5/3 [employer]. Work-life balance is good.  - Sexual Activity: with    - Hobbies: golf, shopping, mobile games,   - Nutrition:     A lot of seafood, salad. Tuna, shrimp.   Water. Diet coke.   No snacking. Appetite controlled again.    - Physical Activity: golfing, swimming.     HISTORY:  Past Medical History:    Anxiety      Past Surgical History:   Procedure Laterality Date    [place] teeth removed      2021      Family History   Problem Relation Age of Onset    Cancer Father         Lung    Obesity Sister     Breast Cancer Maternal Grandmother         post    Stroke Maternal Grandmother     Cancer Maternal Grandmother         Breast Cancer, Lung Cancer      Social History:   Social History     Socioeconomic History    Marital status:    Tobacco Use    Smoking status: Every Day     Current packs/day: 1.00     Average packs/day: 1 pack/day for 22.0 years (22.0 ttl pk-yrs)     Types: Cigarettes    Smokeless tobacco: Never    Tobacco comments:     1 ppd, 22 years   Vaping Use    Vaping status: Never Used   Substance and Sexual Activity    Alcohol use: Yes     Alcohol/week: 10.0 standard drinks of alcohol     Types: 10 Glasses of wine per week     Comment: 10 glasses of wine    Drug use: Never    Sexual activity: Yes     Birth control/protection: OCP     Social Drivers of  Health     Transportation Needs: Unknown (8/24/2020)    [facility]    [facility] - Transportation     Lack of Transportation (Medical): Patient declined     Lack of Transportation (Non-Medical): Patient declined        Medications (Active prior to today's visit):  Current Outpatient Medications   Medication Sig Dispense Refill    Tirzepatide-Weight Management (ZEPBOUND) 7.5 MG/0.5ML Subcutaneous Solution Auto-injector Inject 7.5 mg into the skin once a week. 2 mL 1    LORAZEPAM 1 MG Oral Tab TAKE 1 TABLET (1 MG TOTAL) BY MOUTH EVERY 12 (TWELVE) HOURS AS NEEDED. 60 tablet 3    Norethindrone 0.35 MG Oral Tab Take 1 tablet (0.35 mg total) by mouth daily. 84 tablet 4       Allergies:  Allergies   Allergen Reactions    Avocado NAUSEA ONLY and OTHER (SEE COMMENTS)     Abdominal pain         ROS:   Positive Findings indicated in BOLD    Constitutional: Fever, Chills, Weight Gain, Weight Loss, Night Sweats, Fatigue, Malaise  ENT/Mouth:  Hearing Changes, Ear Pain, Nasal Congestion, Sinus Pain, Hoarseness, Sore throat, Rhinorrhea, Swallowing Difficulty, Jaw pain   Eyes: Eye Pain, Swelling, Redness, Foreign Body, Discharge, Vision Changes  Cardiovascular: Chest Pain, SOB, PND, Dyspnea on Exertion, Orthopnea, Claudication, Edema, Palpitations  Respiratory: Cough, Sputum, Wheezing, Shortness of breath  Gastrointestinal: Nausea, Vomiting, Diarrhea, Constipation, Pain, Heartburn, Dysphagia, Bloody stools, Tarry stools  Genitourinary: Dysmenorrhea, Dysuria, Urinary Frequency, Hematuria, Urinary Incontinence, Urgency,  Flank Pain  Musculoskeletal: Arthralgias, Myalgias, Joint Swelling, Joint Stiffness, Back Pain, Neck Pain  Integumentary: Skin Lesions, Pruritis, Hair Changes, Jaundice, Nail changes  Neuro: Weakness, Numbness, Paresthesias, Loss of Consciousness, Syncope, Dizziness, Headache, Falls  Psych: Anxiety, Depression, Insomnia, Suicidal Ideation, Homicidal ideation, Memory  Changes  Heme/Lymph: Bruising, Bleeding, Lymphadenopathy  Endocrine: Polyuria, Polydipsia, Temperature Intolerance    PHYSICAL EXAM:   Vital Signs:  Last menstrual period 01/09/2024.     Constitutional: No acute distress. Alert and oriented x 3.  Eyes: EOMI, PERRLA, clear sclera b/l  HENT: NCAT, Moist mucous membranes, Oropharynx without erythema or exudates  Neck: No JVD, no thyromegaly  Cardiovascular: S1, S2, no S3, no S4, Regular rate and rhythm, No murmurs/gallops/rubs.   Respiratory: Clear to auscultation bilaterally.  No wheezes/rales/rhonchi  Gastrointestinal: Soft, nontender, nondistended. Positive bowel sounds x 4. No rebound tenderness. No hepatomegaly, No splenomegaly  Genitourinary: No CVA tenderness bilaterally  Neurologic: No focal neurological deficits, CN II-XII intact, light touch intact, MSK Strength 5/5 and symmetric in all extremities, normal gait, 2+ patellar tendon  Musculoskeletal: Full range of motion of all extremities, no clubbing/swelling/edema  Skin: No lesions, No erythema, no jaundice, Cap Refill < 2s  Psychiatric: Appropriate mood and affect  Heme/Lymph/Immune: No cervical LAD    OBGYN - per OBGYN - Dr. Garcia    DATA REVIEWED   Labs:  No results found for this or any previous visit (from the past 8760 hours).      No results found for this or any previous visit (from the past 8760 hours).      Cholesterol, Total (mg/dL)   Date Value   01/27/2023 134     HDL Cholesterol (mg/dL)   Date Value   01/27/2023 47     LDL Cholesterol (mg/dL)   Date Value   01/27/2023 71     Triglycerides (mg/dL)   Date Value   01/27/2023 80       Last A1c value was  % done  .    MRI brain 3/14/2022  IMPRESSION:   9 x 5 mm area of nonenhancing low T1, increased T2 and FLAIR signal noted   in the right basal ganglia (series 8, image 17).  No associated volume   loss.  This is nonspecific and may represent a single focus of chronic   demyelination/gliosis, focally dilated perivascular space or less likely    remote lacunar infarction.   The rest of the brain parenchyma appears unremarkable.      Chest x-ray 6/17/2022  IMPRESSION:      Normal heart size.  No mediastinal widening or adenopathy.  No lung   consolidation or effusions.      Mammogram 11/22/2022  Impression   CONCLUSION:   There is no mammographic evidence of malignancy in either breast. As long as patient's clinical breast exam remains unchanged, annual screening mammogram is recommended.       BI-RADS CATEGORY:     DIAGNOSTIC CATEGORY 1--NEGATIVE ASSESSMENT.         Pathology:  Pap smear 3/25/2024  Specimen Adequacy Satisfactory for evaluation. Endocervical or metaplastic cells present   General Categorization     Negative for intraepithelial lesion or malignancy   HPV High Risk mRNA    Negative  Normal          ASSESSMENT/PLAN:     Skin nodule  Localized to the neck.  None apparent at this time  -Possible lymphadenopathy, recent COVID infection but seems to have resolved.  Will monitor for now    Abnormal intracranial imaging  Nonspecific signal changes of the right basal ganglia, was evaluated by neurology Dr. Bradford with plans for repeat MRI brain in 1-2 years. - Otherwise remains asymptomatic     Hypertriglyceridemia   ASCVD 1.4%, lipid panel within normal limits  - Complemented patient on her great progress.  Continue with regular weight loss over time  -Check fasting lipid panel     Generalized anxiety disorder  Seems to be well controlled  - Lorazepam as needed     Weight management  Concern for weight gain without other associated symptoms concerning for secondary causes.     Wt Readings from Last 6 Encounters:   10/03/24 193 lb 8 oz (87.8 kg)   05/14/24 202 lb 9.6 oz (91.9 kg)   03/12/24 196 lb 9.6 oz (89.2 kg)   01/26/24 201 lb (91.2 kg)   08/22/23 222 lb 12.8 oz (101.1 kg)   05/22/23 238 lb (108 kg)          We will obtain fasting blood work to rule out secondary causes, though low suspicion for secondary cause     We discussed the importance  of net caloric reduction by:  -Optimizing nutrition.  Would benefit to focus on high-fiber intake with fruits, vegetables.  Opt for leaner meats such as chicken/fish/turkey/pork, baked rather than fried/use of high oil content.  Low-fat dairy can be incorporated.  - The goal for nutrition is to gradually decrease calories per day.  We discussed other methods to achieve this such as decreasing excessive snacking, decreasing portions, decrease the frequency of eating out.  - Weight gain is best obtained by modifying food intake.  However, exercise can help achieve caloric reduction by burning off calories.  Recommend at least 150 minutes of moderate intensity exercise for weight maintenance.  Higher intensity exercise for longer periods of time can also promote weight loss   -These lifestyle changes should be viewed as long-term even with weight loss.  This promotes overall general health and decrease risk for chronic diseases in the future.  -She is paying for Zepbound out-of-pocket.  We will increase to 10 mg weekly injection.      Active tobacco use  -Method: Cigarette smoking:    -Impact: Aware of the need to quit  -Willingness to quit: Has tried previously on Chantix.  -Discussed the need to stop smoking altogether, aware of the risk for lung cancer, COPD  -Recommended gradual weaning off of cigarettes by decreasing 1-2 cigarettes every 1-2 weeks.  -We should also try to avoid any triggering places, people, situations that may increase cravings  -We also discussed nicotine replacement therapy     Use according to the following 12-week dosing schedule:    Weeks 1 to 6: Chew 1 piece of gum every 1 to 2 hours (maximum: 24 pieces/day); to increase chances of quitting, chew at least 9 pieces/day during the first 6 weeks.    Weeks 7 to 9: Chew 1 piece of gum every 2 to 4 hours (maximum: 24 pieces/day).    Weeks 10 to 12: Chew 1 piece of gum every 4 to 8 hours (maximum: 24 pieces/day).    Will proceed with Wellbutrin  150 mg once a day.    -Patient has agreed to the above plan and will implement today, and we will try to follow-up through MyChart/phone call in 1 month, consider clinic visit in 3 months.    We spent 5 minutes discussing the above        .       Orders This Visit:  No orders of the defined types were placed in this encounter.      Meds This Visit:  Requested Prescriptions      No prescriptions requested or ordered in this encounter       Imaging & Referrals:  None       Health Maintenance  HTN Screen: At goal  DM Screen: Check fasting sugar  HLD Screen: Check fasting lipid panel  Osteoporosis Screen (>65 or < 65 with FRAX > 9.3%): Not indicated  HCV Screen: Considered low risk  HIV Screen: considered low risk  G/C/Syphilis: Considered low risk    Colon Cancer Screening (45-70): Not indicated  Breast Cancer Screening (40-70): Order given for mammogram  Cervical Cancer Screening (21-64): Last Pap smear 2020, due in 2023 - referral for Dr. Garcia for well woman exams  Lung Cancer Screening (55-79 with 30 p/year and active < 15 years): Not indicated    Influenza: Annually  Td/Tdap: Last Tdap 2022, due 2032  Zoster (50+): Not indicated  HPV (19-26): Not indicated  Pneumococcal: UTD    Immunization History   Administered Date(s) Administered    Covid-19 Vaccine Moderna 100 mcg/0.5 ml 03/26/2021, 04/24/2021    Covid-19 Vaccine Moderna 50 Mcg/0.25 Ml 12/02/2021    FLUZONE 6 months and older PFS 0.5 ml (97778) 09/18/2017    Pneumovax 23 02/17/2016    TDAP 08/11/2014, 02/05/2022         Return to clinic in 6 months for follow-up    John Cerda MD, 02/27/25, 10:14 AM

## 2025-02-27 ENCOUNTER — OFFICE VISIT (OUTPATIENT)
Dept: INTERNAL MEDICINE CLINIC | Facility: CLINIC | Age: 45
End: 2025-02-27
Payer: COMMERCIAL

## 2025-02-27 VITALS
TEMPERATURE: 98 F | BODY MASS INDEX: 34.49 KG/M2 | DIASTOLIC BLOOD PRESSURE: 84 MMHG | SYSTOLIC BLOOD PRESSURE: 132 MMHG | OXYGEN SATURATION: 99 % | HEIGHT: 64 IN | HEART RATE: 97 BPM | WEIGHT: 202 LBS

## 2025-02-27 DIAGNOSIS — Z13.29 SCREENING FOR THYROID DISORDER: ICD-10-CM

## 2025-02-27 DIAGNOSIS — Z13.1 SCREENING FOR DIABETES MELLITUS: ICD-10-CM

## 2025-02-27 DIAGNOSIS — E78.1 HYPERTRIGLYCERIDEMIA: ICD-10-CM

## 2025-02-27 DIAGNOSIS — R73.01 ELEVATED FASTING GLUCOSE: ICD-10-CM

## 2025-02-27 DIAGNOSIS — Z13.220 SCREENING FOR LIPOID DISORDERS: ICD-10-CM

## 2025-02-27 DIAGNOSIS — F41.1 GENERALIZED ANXIETY DISORDER: ICD-10-CM

## 2025-02-27 DIAGNOSIS — Z12.11 SCREENING FOR COLON CANCER: ICD-10-CM

## 2025-02-27 DIAGNOSIS — Z13.0 SCREENING FOR DEFICIENCY ANEMIA: ICD-10-CM

## 2025-02-27 DIAGNOSIS — Z72.0 TOBACCO USE: ICD-10-CM

## 2025-02-27 DIAGNOSIS — R92.30 DENSE BREAST: ICD-10-CM

## 2025-02-27 DIAGNOSIS — Z12.31 ENCOUNTER FOR SCREENING MAMMOGRAM FOR MALIGNANT NEOPLASM OF BREAST: ICD-10-CM

## 2025-02-27 DIAGNOSIS — E88.810 METABOLIC SYNDROME: ICD-10-CM

## 2025-02-27 DIAGNOSIS — Z00.00 ANNUAL PHYSICAL EXAM: Primary | ICD-10-CM

## 2025-02-27 RX ORDER — BUPROPION HYDROCHLORIDE 150 MG/1
150 TABLET ORAL DAILY
Qty: 30 TABLET | Refills: 0 | Status: CANCELLED | OUTPATIENT
Start: 2025-02-27

## 2025-02-27 RX ORDER — TIRZEPATIDE 10 MG/.5ML
10 INJECTION, SOLUTION SUBCUTANEOUS WEEKLY
Qty: 2 ML | Refills: 3 | Status: SHIPPED | OUTPATIENT
Start: 2025-02-27 | End: 2025-03-21

## 2025-02-27 RX ORDER — BUPROPION HYDROCHLORIDE 150 MG/1
150 TABLET ORAL DAILY
Qty: 90 TABLET | Refills: 1 | Status: SHIPPED | OUTPATIENT
Start: 2025-02-27

## 2025-03-18 RX ORDER — ACETAMINOPHEN AND CODEINE PHOSPHATE 120; 12 MG/5ML; MG/5ML
0.35 SOLUTION ORAL DAILY
Qty: 84 TABLET | Refills: 3 | OUTPATIENT
Start: 2025-03-18

## 2025-03-19 RX ORDER — ACETAMINOPHEN AND CODEINE PHOSPHATE 120; 12 MG/5ML; MG/5ML
0.35 SOLUTION ORAL DAILY
Qty: 84 TABLET | Refills: 4 | OUTPATIENT
Start: 2025-03-19

## 2025-04-01 ENCOUNTER — PATIENT MESSAGE (OUTPATIENT)
Dept: INTERNAL MEDICINE CLINIC | Facility: CLINIC | Age: 45
End: 2025-04-01

## 2025-04-03 RX ORDER — TIRZEPATIDE 10 MG/.5ML
10 INJECTION, SOLUTION SUBCUTANEOUS WEEKLY
COMMUNITY
Start: 2025-04-03 | End: 2025-04-03

## 2025-04-03 RX ORDER — TIRZEPATIDE 12.5 MG/.5ML
12.5 INJECTION, SOLUTION SUBCUTANEOUS WEEKLY
Qty: 2 ML | Refills: 3 | Status: SHIPPED | OUTPATIENT
Start: 2025-04-03 | End: 2025-04-25

## 2025-04-07 RX ORDER — TIRZEPATIDE 12.5 MG/.5ML
12.5 INJECTION, SOLUTION SUBCUTANEOUS WEEKLY
Qty: 2 ML | Refills: 3
Start: 2025-04-07 | End: 2025-04-29

## 2025-05-14 ENCOUNTER — TELEPHONE (OUTPATIENT)
Dept: OBGYN CLINIC | Facility: CLINIC | Age: 45
End: 2025-05-14

## 2025-05-15 RX ORDER — ACETAMINOPHEN AND CODEINE PHOSPHATE 120; 12 MG/5ML; MG/5ML
0.35 SOLUTION ORAL DAILY
Qty: 84 TABLET | Refills: 3 | OUTPATIENT
Start: 2025-05-15

## 2025-05-15 NOTE — TELEPHONE ENCOUNTER
Overdue for annual, no future appointment noted. Did not complete last mammogram ordered. Last on fill is from 2022. Prescription denied

## 2025-05-16 NOTE — TELEPHONE ENCOUNTER
Called patient and informed we received request for Oral contraceptive refill. Informed patient annual and mammogram are required to be current before refills are provided. Asked patient if she is doing mammogram's outside of [facility] because last mammogram on file is from 2022. Patient states she is not. States she will schedule it now. Patient booked mammogram on 73/25 and has annual scheduled on 7/24/25. Reports she has about 1.5 weeks worth of pills remaining.   Last annual 03/2024, normal Pap and negative human papillomavirus.     To Dr. Garcia to ask if you authorize ocp prescription refill to cover patient to next annual appointment?

## 2025-05-16 NOTE — TELEPHONE ENCOUNTER
Patient called in schedule annual appointment for 07/24/25 with Dr Garcia.   Patient request refill until appointment

## 2025-05-20 RX ORDER — ACETAMINOPHEN AND CODEINE PHOSPHATE 120; 12 MG/5ML; MG/5ML
0.35 SOLUTION ORAL DAILY
Qty: 84 TABLET | Refills: 0 | Status: SHIPPED | OUTPATIENT
Start: 2025-05-20

## 2025-07-07 ENCOUNTER — HOSPITAL ENCOUNTER (OUTPATIENT)
Dept: MAMMOGRAPHY | Facility: HOSPITAL | Age: 45
Discharge: HOME | End: 2025-07-07
Attending: INTERNAL MEDICINE
Payer: COMMERCIAL

## 2025-07-07 DIAGNOSIS — R92.30 DENSE BREAST: ICD-10-CM

## 2025-07-07 DIAGNOSIS — Z12.31 ENCOUNTER FOR SCREENING MAMMOGRAM FOR MALIGNANT NEOPLASM OF BREAST: ICD-10-CM

## 2025-07-07 LAB — HM MAMMOGRAPHY BILATERAL: NORMAL

## 2025-07-07 PROCEDURE — 77067 SCR MAMMO BI INCL CAD: CPT | Performed by: RADIOLOGY

## 2025-07-07 PROCEDURE — 77063 BREAST TOMOSYNTHESIS BI: CPT | Performed by: RADIOLOGY

## 2025-07-09 RX ORDER — ACETAMINOPHEN AND CODEINE PHOSPHATE 120; 12 MG/5ML; MG/5ML
0.35 SOLUTION ORAL DAILY
Qty: 84 TABLET | Refills: 3 | OUTPATIENT
Start: 2025-07-09

## 2025-07-24 ENCOUNTER — OFFICE VISIT (OUTPATIENT)
Dept: OBGYN CLINIC | Facility: CLINIC | Age: 45
End: 2025-07-24
Payer: COMMERCIAL

## 2025-07-24 VITALS
WEIGHT: 186.38 LBS | DIASTOLIC BLOOD PRESSURE: 85 MMHG | BODY MASS INDEX: 32 KG/M2 | SYSTOLIC BLOOD PRESSURE: 122 MMHG | HEART RATE: 103 BPM

## 2025-07-24 DIAGNOSIS — Z12.31 ENCOUNTER FOR SCREENING MAMMOGRAM FOR MALIGNANT NEOPLASM OF BREAST: ICD-10-CM

## 2025-07-24 DIAGNOSIS — Z01.419 WELL WOMAN EXAM WITH ROUTINE GYNECOLOGICAL EXAM: Primary | ICD-10-CM

## 2025-07-24 RX ORDER — TIRZEPATIDE 12.5 MG/.5ML
INJECTION, SOLUTION SUBCUTANEOUS
COMMUNITY

## 2025-07-24 RX ORDER — ACETAMINOPHEN AND CODEINE PHOSPHATE 120; 12 MG/5ML; MG/5ML
0.35 SOLUTION ORAL DAILY
Qty: 84 TABLET | Refills: 4 | Status: SHIPPED | OUTPATIENT
Start: 2025-07-24

## 2025-07-24 NOTE — PROGRESS NOTES
Rosemary Sinha is a 44 year old female  No LMP recorded.   Chief Complaint   Patient presents with    Gyn Exam     Annual // Refill on ocp     History of Present Illness  Rosemary Sinha is a 44 year old female who presents for an annual physical exam.    Menstrual irregularity  - Amenorrhea since switching to a low-dose birth control formulation  - No associated symptoms or concerns    Contraceptive management  - Currently satisfied with birth control regimen  - No need for changes in contraception    Weight management  - Currently using Zepbound for weight management    General health maintenance  - No current health concerns  - No recent illnesses or symptoms of concern  - No need for STD testing    OBSTETRICS HISTORY:  OB History    Para Term  AB Living   0 0 0 0 0 0   SAB IAB Ectopic Multiple Live Births   0 0 0 0 0       GYNE HISTORY:  No LMP recorded.    History   Sexual Activity    Sexual activity: Yes    Partners: Male    Birth control/ protection: OCP        Pap Date: 24  Pap Result Notes: NEG PAP/HPV  Follow Up Recommendation: MAMMO 2025 BI-RADS ASSESSMENT: 2: Benign      MEDICAL HISTORY:  Past Medical History:    Anxiety         SURGICAL HISTORY:  Past Surgical History:   Procedure Laterality Date    [place] teeth removed             SOCIAL HISTORY:  Social History     Socioeconomic History    Marital status:      Spouse name: Not on file    Number of children: Not on file    Years of education: Not on file    Highest education level: Not on file   Occupational History    Not on file   Tobacco Use    Smoking status: Every Day     Current packs/day: 1.00     Average packs/day: 1 pack/day for 22.0 years (22.0 ttl pk-yrs)     Types: Cigarettes    Smokeless tobacco: Never    Tobacco comments:     1 ppd, 22 years   Vaping Use    Vaping status: Never Used   Substance and Sexual Activity    Alcohol use: Yes     Alcohol/week: 10.0 standard drinks of alcohol      Types: 10 Glasses of wine per week     Comment: 10 glasses of wine    Drug use: Never    Sexual activity: Yes     Partners: Male     Birth control/protection: OCP   Other Topics Concern    Not on file   Social History Narrative    Not on file     Social Drivers of Health     Food Insecurity: No Food Insecurity (7/24/2025)    NCSS - Food Insecurity     Worried About Running Out of Food in the Last Year: No     Ran Out of Food in the Last Year: No   Transportation Needs: No Transportation Needs (7/24/2025)    NCSS - Transportation     Lack of Transportation: No   Stress: Not on file   Housing Stability: Not At Risk (7/24/2025)    NCSS - Housing/Utilities     Has Housing: Yes     Worried About Losing Housing: No     Unable to Get Utilities: No         Depression Screening (PHQ-2/PHQ-9): Over the LAST 2 WEEKS   Little interest or pleasure in doing things (over the last two weeks)?: Not at all    Feeling down, depressed, or hopeless (over the last two weeks)?: Not at all    PHQ-2 SCORE: 0           MEDICATIONS:    Current Outpatient Medications:     Tirzepatide-Weight Management (ZEPBOUND) 12.5 MG/0.5ML Subcutaneous Solution Auto-injector, , Disp: , Rfl:     Norethindrone 0.35 MG Oral Tab, Take 1 tablet (0.35 mg total) by mouth daily., Disp: 84 tablet, Rfl: 4    LORAZEPAM 1 MG Oral Tab, TAKE 1 TABLET (1 MG TOTAL) BY MOUTH EVERY 12 (TWELVE) HOURS AS NEEDED., Disp: 60 tablet, Rfl: 3    nicotine polacrilex ([employer] NICOTINE) 2 MG Mouth/Throat Gum, Take 1 each (2 mg total) by mouth as needed for Smoking cessation., Disp: 150 each, Rfl: 1    buPROPion  MG Oral Tablet 24 Hr, Take 1 tablet (150 mg total) by mouth daily., Disp: 90 tablet, Rfl: 1    ALLERGIES:    Allergies   Allergen Reactions    Avocado NAUSEA ONLY and OTHER (SEE COMMENTS)     Abdominal pain         Review of Systems:  Review of Systems   All other systems reviewed and are negative.       Vitals:    07/24/25 1354   BP: 122/85   Pulse: 103       PHYSICAL EXAM:    Physical Exam  Vitals reviewed.   Constitutional:       Appearance: Normal appearance.   HENT:      Head: Atraumatic.   Eyes:      Pupils: Pupils are equal, round, and reactive to light.   Pulmonary:      Effort: Pulmonary effort is normal.   Chest:   Breasts:     Right: Normal. No bleeding, inverted nipple, mass, nipple discharge, skin change or tenderness.      Left: Normal. No bleeding, inverted nipple, mass, nipple discharge, skin change or tenderness.   Abdominal:      General: Abdomen is flat.      Palpations: Abdomen is soft.      Tenderness: There is no abdominal tenderness.   Genitourinary:     General: Normal vulva.      Exam position: Lithotomy position.      Labia:         Right: No rash, tenderness, lesion or injury.         Left: No rash, tenderness, lesion or injury.       Vagina: Normal.      Cervix: Normal.      Uterus: Normal. Not tender.       Adnexa: Right adnexa normal and left adnexa normal.        Right: No tenderness or fullness.          Left: No tenderness or fullness.     Lymphadenopathy:      Upper Body:      Right upper body: No supraclavicular, axillary or pectoral adenopathy.      Left upper body: No supraclavicular, axillary or pectoral adenopathy.   Skin:     General: Skin is warm and dry.   Neurological:      General: No focal deficit present.      Mental Status: She is alert and oriented to person, place, and time.   Psychiatric:         Mood and Affect: Mood normal.         Behavior: Behavior normal.         Thought Content: Thought content normal.         Judgment: Judgment normal.           Assessment & Plan:  Rosemary was seen today for gyn exam.    Diagnoses and all orders for this visit:    Well woman exam with routine gynecological exam    Encounter for screening mammogram for malignant neoplasm of breast    Other orders  -     Norethindrone 0.35 MG Oral Tab; Take 1 tablet (0.35 mg total) by mouth daily.        Assessment & Plan  Annual Gynecological Exam  Routine exam. Pap  smear current. Normal mammogram. Amenorrheic on low-dose birth control, satisfied.  - Refill birth control prescription.    Weight Management Medication Use  On Zepbound for weight management. Discussed reduced birth control effectiveness with weight management medications.    Recording duration: 6 minutes    Requested Prescriptions     Signed Prescriptions Disp Refills    Norethindrone 0.35 MG Oral Tab 84 tablet 4     Sig: Take 1 tablet (0.35 mg total) by mouth daily.

## 2025-07-24 NOTE — PROGRESS NOTES
The following individual(s) verbally consented to be recorded using ambient AI listening technology and understand that they can each withdraw their consent to this listening technology at any point by asking the clinician to turn off or pause the recording:    Patient name: Rosemary Hendrickson Ernestine  Additional names:

## 2025-08-19 ENCOUNTER — TELEPHONE (OUTPATIENT)
Dept: INTERNAL MEDICINE CLINIC | Facility: CLINIC | Age: 45
End: 2025-08-19

## 2025-08-29 ENCOUNTER — OFFICE VISIT (OUTPATIENT)
Dept: INTERNAL MEDICINE | Age: 45
End: 2025-08-29

## 2025-08-29 VITALS
WEIGHT: 185.96 LBS | OXYGEN SATURATION: 100 % | DIASTOLIC BLOOD PRESSURE: 82 MMHG | BODY MASS INDEX: 31.75 KG/M2 | SYSTOLIC BLOOD PRESSURE: 114 MMHG | HEIGHT: 64 IN | TEMPERATURE: 98.2 F | HEART RATE: 102 BPM | RESPIRATION RATE: 16 BRPM

## 2025-08-29 DIAGNOSIS — Z76.89 ENCOUNTER FOR WEIGHT MANAGEMENT: ICD-10-CM

## 2025-08-29 DIAGNOSIS — Z13.0 SCREENING FOR DEFICIENCY ANEMIA: ICD-10-CM

## 2025-08-29 DIAGNOSIS — Z13.220 SCREENING FOR LIPOID DISORDERS: ICD-10-CM

## 2025-08-29 DIAGNOSIS — Z00.00 LABORATORY TESTS ORDERED AS PART OF A COMPLETE PHYSICAL EXAM (CPE): ICD-10-CM

## 2025-08-29 DIAGNOSIS — Z00.00 ROUTINE GENERAL MEDICAL EXAMINATION AT A HEALTH CARE FACILITY: ICD-10-CM

## 2025-08-29 DIAGNOSIS — F41.1 GENERALIZED ANXIETY DISORDER: ICD-10-CM

## 2025-08-29 DIAGNOSIS — K76.0 HEPATIC STEATOSIS: ICD-10-CM

## 2025-08-29 DIAGNOSIS — E78.2 MIXED HYPERLIPIDEMIA: ICD-10-CM

## 2025-08-29 DIAGNOSIS — Z12.11 SCREENING FOR COLON CANCER: ICD-10-CM

## 2025-08-29 DIAGNOSIS — Z76.89 ENCOUNTER TO ESTABLISH CARE: Primary | ICD-10-CM

## 2025-08-29 PROBLEM — G56.01 CARPAL TUNNEL SYNDROME OF RIGHT WRIST: Status: ACTIVE | Noted: 2024-10-03

## 2025-08-29 RX ORDER — ACETAMINOPHEN AND CODEINE PHOSPHATE 120; 12 MG/5ML; MG/5ML
0.35 SOLUTION ORAL
COMMUNITY
Start: 2025-07-24

## 2025-08-29 RX ORDER — TIRZEPATIDE 12.5 MG/.5ML
INJECTION, SOLUTION SUBCUTANEOUS
COMMUNITY
End: 2025-08-29 | Stop reason: ALTCHOICE

## 2025-08-29 RX ORDER — TIRZEPATIDE 12.5 MG/.5ML
INJECTION, SOLUTION SUBCUTANEOUS
Qty: 2 ML | Status: CANCELLED | OUTPATIENT
Start: 2025-08-29

## 2025-08-29 RX ORDER — LORAZEPAM 1 MG/1
1 TABLET ORAL EVERY 12 HOURS PRN
Qty: 30 TABLET | Refills: 1 | Status: SHIPPED | OUTPATIENT
Start: 2025-08-29

## 2025-08-29 RX ORDER — TIRZEPATIDE 15 MG/.5ML
15 INJECTION, SOLUTION SUBCUTANEOUS
Qty: 2 ML | Refills: 3 | Status: SHIPPED | OUTPATIENT
Start: 2025-08-29

## 2025-08-29 SDOH — ECONOMIC STABILITY: TRANSPORTATION INSECURITY
IN THE PAST 12 MONTHS, HAS LACK OF RELIABLE TRANSPORTATION KEPT YOU FROM MEDICAL APPOINTMENTS, MEETINGS, WORK OR FROM GETTING THINGS NEEDED FOR DAILY LIVING?: NO

## 2025-08-29 SDOH — ECONOMIC STABILITY: FOOD INSECURITY: WITHIN THE PAST 12 MONTHS, THE FOOD YOU BOUGHT JUST DIDN'T LAST AND YOU DIDN'T HAVE MONEY TO GET MORE.: NEVER TRUE

## 2025-08-29 SDOH — ECONOMIC STABILITY: HOUSING INSECURITY: DO YOU HAVE PROBLEMS WITH ANY OF THE FOLLOWING?: NONE OF THE ABOVE

## 2025-08-29 SDOH — ECONOMIC STABILITY: HOUSING INSECURITY: WHAT IS YOUR LIVING SITUATION TODAY?: I HAVE A STEADY PLACE TO LIVE

## 2025-08-29 ASSESSMENT — PATIENT HEALTH QUESTIONNAIRE - PHQ9
2. FEELING DOWN, DEPRESSED OR HOPELESS: NOT AT ALL
SUM OF ALL RESPONSES TO PHQ9 QUESTIONS 1 AND 2: 0
SUM OF ALL RESPONSES TO PHQ9 QUESTIONS 1 AND 2: 0
1. LITTLE INTEREST OR PLEASURE IN DOING THINGS: NOT AT ALL

## 2025-08-29 ASSESSMENT — SOCIAL DETERMINANTS OF HEALTH (SDOH): IN THE PAST 12 MONTHS, HAS THE ELECTRIC, GAS, OIL, OR WATER COMPANY THREATENED TO SHUT OFF SERVICE IN YOUR HOME?: NO

## (undated) NOTE — MR AVS SNAPSHOT
After Visit Summary   3/12/2024    Rosemary Sinha   MRN: [id number]           Visit Information     Date & Time  3/12/2024  8:40 AM Provider  Leta Garcia MD [facility]. Phone  [phone]      Your Vitals Were  Most recent update: 3/12/2024  8:46 AM    BP   110/78          Pulse   88          Ht   64\"          Wt   196 lb 9.6 oz          LMP   01/09/2024             BMI   33.75 kg/m²         Allergies as of 3/12/2024  Review status set to Review Complete on 3/12/2024       Noted Reaction Type Reactions    Avocado 03/01/2016    NAUSEA ONLY, OTHER (SEE COMMENTS)    Abdominal pain      Your Current Medications        Dosage    Norethindrone 0.35 MG Oral Tab Take 1 tablet (0.35 mg total) by mouth daily.    LORazepam 1 MG Oral Tab Take 1 tablet (1 mg total) by mouth every 12 (twelve) hours as needed.      Diagnoses for This Visit    Well woman exam with routine gynecological exam   [984576]  -  Primary  Screening for cervical cancer   [967294]             We Ordered the Following     Normal Orders This Visit    Hpv Dna  High Risk , Thin Prep Collect [[id number] CUSTOM]     THINPREP PAP SMEAR ONLY [[id number] CUSTOM]     ThinPrep PAP Smear [[id number] CUSTOM]     Future Labs/Procedures Expected by Expires    Hpv Dna  High Risk , Thin Prep Collect [[id number] CUSTOM]  3/12/2024 3/12/2025    ThinPrep PAP Smear [[id number] CUSTOM]  3/12/2024 3/12/2025      Future Appointments        Provider Department    4/29/2024 2:20 PM [facility]                Did you know that [facility] [place] physicians now offer Video Visits through [employer] for adult patients for a variety of conditions such as allergies, back pain and cold symptoms? Skip the drive and waiting room and online chat with a doctor face-to-face using your web-cam enabled computer or mobile device wherever you are. Video Visits cost $50 and can be paid  hassle-free using a credit, debit, or health savings card.  Not active on [employer]? Ask us how to get signed up today!          If you receive a survey from [facility], please take a few minutes to complete it and provide feedback. We strive to deliver the best patient experience and are looking for ways to make improvements. Your feedback will help us do so. For more information on [employer], please visit [email or web address]           No text in SmartText           No text in SmartText